# Patient Record
Sex: FEMALE | Race: WHITE | NOT HISPANIC OR LATINO | Employment: FULL TIME | URBAN - METROPOLITAN AREA
[De-identification: names, ages, dates, MRNs, and addresses within clinical notes are randomized per-mention and may not be internally consistent; named-entity substitution may affect disease eponyms.]

---

## 2024-05-22 ENCOUNTER — HOSPITAL ENCOUNTER (EMERGENCY)
Facility: HOSPITAL | Age: 29
Discharge: HOME | End: 2024-05-22
Attending: EMERGENCY MEDICINE
Payer: COMMERCIAL

## 2024-05-22 VITALS
HEART RATE: 87 BPM | DIASTOLIC BLOOD PRESSURE: 77 MMHG | OXYGEN SATURATION: 97 % | HEIGHT: 63 IN | TEMPERATURE: 98.1 F | SYSTOLIC BLOOD PRESSURE: 117 MMHG | RESPIRATION RATE: 16 BRPM | WEIGHT: 275 LBS | BODY MASS INDEX: 48.73 KG/M2

## 2024-05-22 DIAGNOSIS — O20.0 THREATENED MISCARRIAGE: Primary | ICD-10-CM

## 2024-05-22 LAB
ABO GROUP BLD: NORMAL
BACTERIA UR QL AUTO: ABNORMAL /HPF
BILIRUB UR QL STRIP: NEGATIVE
CLARITY UR: CLEAR
COLOR UR: YELLOW
GLUCOSE UR STRIP-MCNC: NEGATIVE MG/DL
HCG INTACT+B SERPL-ACNC: 15.9 MIU/ML
HGB UR QL STRIP.AUTO: ABNORMAL
HYALINE CASTS UR QL AUTO: ABNORMAL /LPF
KETONES UR QL STRIP: NEGATIVE
LEUKOCYTE ESTERASE UR QL STRIP.AUTO: NEGATIVE
NITRITE UR QL STRIP: NEGATIVE
PH UR STRIP.AUTO: 7 [PH] (ref 4.5–8)
PROT UR QL STRIP: NEGATIVE
RBC # UR STRIP: ABNORMAL /HPF
REF LAB TEST METHOD: ABNORMAL
RH BLD: POSITIVE
SP GR UR STRIP: 1.02 (ref 1–1.03)
SQUAMOUS #/AREA URNS HPF: ABNORMAL /HPF
UROBILINOGEN UR QL STRIP: ABNORMAL
WBC # UR STRIP: ABNORMAL /HPF

## 2024-05-22 PROCEDURE — 81001 URINALYSIS AUTO W/SCOPE: CPT | Performed by: EMERGENCY MEDICINE

## 2024-05-22 PROCEDURE — 86901 BLOOD TYPING SEROLOGIC RH(D): CPT | Performed by: EMERGENCY MEDICINE

## 2024-05-22 PROCEDURE — 84702 CHORIONIC GONADOTROPIN TEST: CPT | Performed by: EMERGENCY MEDICINE

## 2024-05-22 PROCEDURE — 99283 EMERGENCY DEPT VISIT LOW MDM: CPT

## 2024-05-22 PROCEDURE — 86900 BLOOD TYPING SEROLOGIC ABO: CPT | Performed by: EMERGENCY MEDICINE

## 2024-05-22 NOTE — ED PROVIDER NOTES
Subjective   History of Present Illness  Patient presents complaining of vaginal bleeding that started yesterday.  Patient said initially it was light and spotting today when she was going to work it increased. but then patient says she had her last menstrual cycle about 2024.  Patient took 2 home pregnancy test with lines that were inconclusive and then she did a digital one that was positive.  Patient has not seen OB/GYN yet.  Patient would be a  if she is pregnant today.  Patient denies any trauma or injury.  Patient says she is just having basic abdominal cramping that feels like a menstrual cycle.  No therapy taken prior to arrival.      Review of Systems   All other systems reviewed and are negative.      No past medical history on file.    No Known Allergies    No past surgical history on file.    No family history on file.    Social History     Socioeconomic History    Marital status: Single           Objective   Physical Exam  Vitals and nursing note reviewed.   Constitutional:       Comments: Patient sitting in bed comfortably, talkative, friendly, no signs of distress.  Cooperative with exam.   HENT:      Head: Normocephalic.      Right Ear: External ear normal.      Left Ear: External ear normal.      Nose: Nose normal.      Mouth/Throat:      Mouth: Mucous membranes are moist.      Pharynx: Oropharynx is clear.   Eyes:      Conjunctiva/sclera: Conjunctivae normal.   Cardiovascular:      Rate and Rhythm: Normal rate and regular rhythm.      Heart sounds: Normal heart sounds.   Pulmonary:      Effort: Pulmonary effort is normal.      Breath sounds: Normal breath sounds.   Abdominal:      General: Abdomen is protuberant. There is no distension.      Palpations: Abdomen is soft.      Tenderness: There is no right CVA tenderness, left CVA tenderness or guarding.      Hernia: No hernia is present.      Comments: Active bowel sounds in all 4 quadrants.   Musculoskeletal:         General: No swelling.       Cervical back: Neck supple.   Skin:     General: Skin is warm and dry.      Capillary Refill: Capillary refill takes 2 to 3 seconds.   Neurological:      Mental Status: She is alert and oriented to person, place, and time.   Psychiatric:         Mood and Affect: Mood normal.         Behavior: Behavior normal.         Procedures           ED Course  ED Course as of 05/22/24 1355   Wed May 22, 2024   1346 Discussed patient with Dr. Gonzales of OB/GYN and he wanted to have the patient follow-up on Tuesday next week for recheck of her quant. [AW]      ED Course User Index  [AW] Antoine Alexander MD                                             Medical Decision Making  Ddx menstrual cycle, miscarriage, implantation bleeding, ruptured ovarian cyst    Labs Reviewed  URINALYSIS W/ MICROSCOPIC IF INDICATED (NO CULTURE) - Abnormal; Notable for the following components:     Blood, UA                     Large (3+) (*)            All other components within normal limits  URINALYSIS, MICROSCOPIC ONLY - Abnormal; Notable for the following components:     RBC, UA                       11-20 (*)            All other components within normal limits  HCG, QUANTITATIVE, PREGNANCY         Narrative: HCG Ranges by Gestational Age                                    Females - non-pregnant premenopausal   </= 1mIU/mL HCG                  Females - postmenopausal               </= 7mIU/mL HCG                                    3 Weeks       5.4   -      72 mIU/mL                  4 Weeks      10.2   -     708 mIU/mL                  5 Weeks       217   -   8,245 mIU/mL                  6 Weeks       152   -  32,177 mIU/mL                  7 Weeks     4,059   - 153,767 mIU/mL                  8 Weeks    31,366   - 149,094 mIU/mL                  9 Weeks    59,109   - 135,901 mIU/mL                  10 Weeks   44,186   - 170,409 mIU/mL                  12 Weeks   27,107   - 201,615 mIU/mL                  14 Weeks   24,302   -  93,646  mIU/mL                  15 Weeks   12,540   -  69,747 mIU/mL                  16 Weeks    8,904   -  55,332 mIU/mL                  17 Weeks    8,240   -  51,793 mIU/mL                  18 Weeks    9,649   -  55,271 mIU/mL                    ABO/RH    1345 Pt seen again prior to d/c.  Labs reviewed and are remarkable for a low quant of 15.  Discussed with patient that this could be due to a very early pregnancy or it could be very low because of a miscarriage.  Discussed with patient that she needs to follow-up in a few days to have her quant rechecked and she was agreeable with this.  I did speak with OB and they are agreeable to see her and have an appointment scheduled.  Symptoms improved and pt feels better, vitals stable and pt. in NAD. Non-toxic. Comfortable. Ambulating without difficulty.  Tolerating po.  Relaxed breathing.  All questions personally answered at the bedside and all d/c instructions personally reviewed with pt.  Discussed the importance of close outpt. f/u and pt. understands this and agrees to do so.  Pt agrees to return to ED immediately for any new, persistent, or worsening symptoms.    EMR Dragon/Transcription disclaimer:  Much of this encounter note is an electronic transcription/translation of spoken language to printed text, aka voice recognition.  The electronic translation of spoken language may permit erroneous or at times nonsensical words or phrases to be inadvertently transcribed; although I have reviewed the note for such errors, some may still exist so please interpret based on surrounding text content.      Problems Addressed:  Threatened miscarriage: complicated acute illness or injury    Amount and/or Complexity of Data Reviewed  Labs: ordered.        Final diagnoses:   Threatened miscarriage       ED Disposition  ED Disposition       ED Disposition   Discharge    Condition   Stable    Comment   --               Faustina Richey, APRN  [address]  [address]  [place]  [id number]  [phone]    Schedule an appointment as soon as possible for a visit on 5/28/2024  1:15pm.  You can come about 30min before your appointment and check in the outpatient lab for your blood drawn.         Medication List      No changes were made to your prescriptions during this visit.            Antoine Alexander MD  05/22/24 [id number]

## 2024-05-23 ENCOUNTER — OFFICE VISIT (OUTPATIENT)
Dept: OBSTETRICS AND GYNECOLOGY | Facility: CLINIC | Age: 29
End: 2024-05-23
Payer: COMMERCIAL

## 2024-05-23 VITALS
SYSTOLIC BLOOD PRESSURE: 132 MMHG | DIASTOLIC BLOOD PRESSURE: 74 MMHG | WEIGHT: 270 LBS | HEIGHT: 63 IN | BODY MASS INDEX: 47.84 KG/M2

## 2024-05-23 DIAGNOSIS — O02.1 MISSED ABORTION: Primary | ICD-10-CM

## 2024-05-23 NOTE — PROGRESS NOTES
27 yo  here today for concern for MAB or threatened AB. Was seen in Ed yesterday, HcG  15.9, A+. LMP [id number]. Has appt with us next week.     This am multiple clots on pad and toilet. Denies SOB, syncope, dizziness.      PMH: No past medical history on file.             PSH:   No past surgical history on file.             Social hx:   Social History     Socioeconomic History    Marital status: Single        [  X ] no h/o abuse/DV:               Allergies:     No Known Allergies               Meds: No current outpatient medications on file.     Review of Systems   Bleeding in pregnancy     Vitals:    24 1357   BP: 132/74        Physical Exam  Constitutional:       Appearance: Normal appearance. She is obese.   Genitourinary:      Genitourinary Comments: Bleeding in vagina; small clots  Os closed   No POC seen       Right Labia: No tenderness, lesions or skin changes.     Left Labia: No tenderness, lesions or skin changes.     No cervical motion tenderness, friability or lesion.      Uterus is not enlarged or tender.   Cardiovascular:      Rate and Rhythm: Normal rate and regular rhythm.   Pulmonary:      Effort: Pulmonary effort is normal.      Breath sounds: Normal breath sounds.   Abdominal:      General: Abdomen is flat.      Palpations: Abdomen is soft.   Neurological:      General: No focal deficit present.      Mental Status: She is alert and oriented to person, place, and time.   Skin:     General: Skin is warm and dry.   Psychiatric:         Mood and Affect: Mood normal.         Behavior: Behavior normal.          Diagnoses and all orders for this visit:    1. Missed  (Primary)  -     HCG, B-subunit, Quantitative     Concerned for MAB or completed AB  Abdomen soft; no s/s of acute abdomen   HcG today. If down trending; consider Cytotec; DID not give today as may have viable pregnancy ( I do not think so but want another data point; pt agrees)   Work note 1 week     Graham Gonzales,  DO  05/23/2024    15:13 EDT

## 2024-05-24 ENCOUNTER — TELEPHONE (OUTPATIENT)
Dept: OBSTETRICS AND GYNECOLOGY | Facility: CLINIC | Age: 29
End: 2024-05-24
Payer: COMMERCIAL

## 2024-05-24 LAB — HCG INTACT+B SERPL-ACNC: 4.41 MIU/ML

## 2024-05-24 NOTE — TELEPHONE ENCOUNTER
I called and s/w pt and informed her that her quant was now 4, indicated a likely completed SAB.     Jordyn Banegas MD

## 2024-05-28 ENCOUNTER — OFFICE VISIT (OUTPATIENT)
Dept: OBSTETRICS AND GYNECOLOGY | Facility: CLINIC | Age: 29
End: 2024-05-28
Payer: COMMERCIAL

## 2024-05-28 VITALS
SYSTOLIC BLOOD PRESSURE: 146 MMHG | WEIGHT: 272.2 LBS | BODY MASS INDEX: 48.23 KG/M2 | DIASTOLIC BLOOD PRESSURE: 88 MMHG | HEIGHT: 63 IN

## 2024-05-28 DIAGNOSIS — O02.1 MISSED ABORTION: Primary | ICD-10-CM

## 2024-05-28 PROCEDURE — 99212 OFFICE O/P EST SF 10 MIN: CPT | Performed by: STUDENT IN AN ORGANIZED HEALTH CARE EDUCATION/TRAINING PROGRAM

## 2024-05-28 NOTE — PROGRESS NOTES
27 yo  here today with completed AB. Cramps stopped Friday afternoon. Dr Banegas talked to her on Friday.     This am multiple clots on pad and toilet. Denies SOB, syncope, dizziness.      PMH: No past medical history on file.             PSH:   No past surgical history on file.             Social hx:   Social History     Socioeconomic History    Marital status: Single        [  X ] no h/o abuse/DV:               Allergies:     No Known Allergies               Meds: No current outpatient medications on file.     Review of Systems   Bleeding in pregnancy     Vitals:    24 1311   BP: 146/88        Physical Exam  Constitutional:       Appearance: Normal appearance. She is obese.   Genitourinary:      No cervical motion tenderness, friability or lesion.      Uterus is not enlarged or tender.   Cardiovascular:      Rate and Rhythm: Normal rate and regular rhythm.   Pulmonary:      Effort: Pulmonary effort is normal.      Breath sounds: Normal breath sounds.   Abdominal:      General: Abdomen is flat.      Palpations: Abdomen is soft.   Neurological:      General: No focal deficit present.      Mental Status: She is alert and oriented to person, place, and time.   Skin:     General: Skin is warm and dry.   Psychiatric:         Mood and Affect: Mood normal.         Behavior: Behavior normal.          Diagnoses and all orders for this visit:    1. Missed  (Primary)       completed AB  Abdomen soft; no s/s of acute abdomen   HcG 4 on Friday   F/u prn   Will try again in 1-3 months     Graham Gonzales DO  2024    13:30 EDT

## 2024-08-21 ENCOUNTER — OFFICE VISIT (OUTPATIENT)
Dept: OBSTETRICS AND GYNECOLOGY | Facility: CLINIC | Age: 29
End: 2024-08-21
Payer: COMMERCIAL

## 2024-08-21 VITALS
WEIGHT: 272 LBS | SYSTOLIC BLOOD PRESSURE: 130 MMHG | HEIGHT: 63 IN | BODY MASS INDEX: 48.2 KG/M2 | DIASTOLIC BLOOD PRESSURE: 84 MMHG

## 2024-08-21 DIAGNOSIS — N93.9 ABNORMAL UTERINE BLEEDING (AUB): ICD-10-CM

## 2024-08-21 DIAGNOSIS — E88.810 METABOLIC SYNDROME X: ICD-10-CM

## 2024-08-21 DIAGNOSIS — Z13.89 SCREENING FOR GENITOURINARY CONDITION: Primary | ICD-10-CM

## 2024-08-21 LAB
B-HCG UR QL: NEGATIVE
BILIRUB BLD-MCNC: NEGATIVE MG/DL
CLARITY, POC: CLEAR
COLOR UR: YELLOW
EXPIRATION DATE: NORMAL
GLUCOSE UR STRIP-MCNC: NEGATIVE MG/DL
INTERNAL NEGATIVE CONTROL: NORMAL
INTERNAL POSITIVE CONTROL: NORMAL
KETONES UR QL: NEGATIVE
LEUKOCYTE EST, POC: NEGATIVE
Lab: NORMAL
NITRITE UR-MCNC: NEGATIVE MG/ML
PH UR: 5 [PH] (ref 5–8)
PROT UR STRIP-MCNC: ABNORMAL MG/DL
RBC # UR STRIP: ABNORMAL /UL
SP GR UR: 1 (ref 1–1.03)
UROBILINOGEN UR QL: NORMAL

## 2024-08-21 NOTE — PROGRESS NOTES
Here today for cramping; LMP [id number]. Did have MAB May 2024. Having bleeding and cramping. Unsure if she had + HcG at home. Denies F/C or N&V.     Menarche 9yo      LMP [id number]   No abnormal pap hx ( Last pap Aug 2023 NILM )     Last Appt: 27 yo  here today with completed AB. Cramps stopped Friday afternoon. Dr Banegas talked to her on Friday.     This am multiple clots on pad and toilet. Denies SOB, syncope, dizziness.      PMH: No past medical history on file.             PSH:   No past surgical history on file.             Social hx:   Social History     Socioeconomic History    Marital status: Single        [  X ] no h/o abuse/DV:               Allergies:     No Known Allergies               Meds: No current outpatient medications on file.     Review of Systems   Bleeding in pregnancy     Vitals:    24 1421   BP: 130/84        Physical Exam  Constitutional:       Appearance: Normal appearance. She is obese.   Genitourinary:      No cervical motion tenderness, friability or lesion.      Uterus is not enlarged or tender.   Cardiovascular:      Rate and Rhythm: Normal rate and regular rhythm.   Pulmonary:      Effort: Pulmonary effort is normal.      Breath sounds: Normal breath sounds.   Abdominal:      General: Abdomen is flat.      Palpations: Abdomen is soft.   Neurological:      General: No focal deficit present.      Mental Status: She is alert and oriented to person, place, and time.   Skin:     General: Skin is warm and dry.   Psychiatric:         Mood and Affect: Mood normal.         Behavior: Behavior normal.          Diagnoses and all orders for this visit:    1. Screening for genitourinary condition (Primary)  -     POC Urinalysis Dipstick  -     POC Pregnancy, Urine    2. Metabolic syndrome X  -     Ambulatory Referral to Internal Medicine    3. Abnormal uterine bleeding (AUB)         US reassuring  IM consult to assist with Met X, Weight loss  Continue timed intercourse and PNV    F/u as scheduled     Graham Gonzales DO  05/28/2024    14:48 EDT

## 2024-09-19 ENCOUNTER — OFFICE VISIT (OUTPATIENT)
Dept: OBSTETRICS AND GYNECOLOGY | Facility: CLINIC | Age: 29
End: 2024-09-19
Payer: COMMERCIAL

## 2024-09-19 VITALS
DIASTOLIC BLOOD PRESSURE: 78 MMHG | SYSTOLIC BLOOD PRESSURE: 124 MMHG | WEIGHT: 269 LBS | HEIGHT: 63 IN | BODY MASS INDEX: 47.66 KG/M2

## 2024-09-19 DIAGNOSIS — E28.2 PCOS (POLYCYSTIC OVARIAN SYNDROME): ICD-10-CM

## 2024-09-19 DIAGNOSIS — E88.810 METABOLIC SYNDROME X: Primary | ICD-10-CM

## 2024-09-26 ENCOUNTER — OFFICE VISIT (OUTPATIENT)
Dept: OBSTETRICS AND GYNECOLOGY | Facility: CLINIC | Age: 29
End: 2024-09-26
Payer: COMMERCIAL

## 2024-09-26 VITALS
SYSTOLIC BLOOD PRESSURE: 140 MMHG | HEIGHT: 63 IN | DIASTOLIC BLOOD PRESSURE: 90 MMHG | BODY MASS INDEX: 47.66 KG/M2 | WEIGHT: 269 LBS

## 2024-09-26 DIAGNOSIS — Z13.89 SCREENING FOR GENITOURINARY CONDITION: Primary | ICD-10-CM

## 2024-09-26 DIAGNOSIS — E88.810 METABOLIC SYNDROME X: ICD-10-CM

## 2024-09-26 DIAGNOSIS — E28.2 PCOS (POLYCYSTIC OVARIAN SYNDROME): ICD-10-CM

## 2024-09-26 LAB
B-HCG UR QL: NEGATIVE
BILIRUB BLD-MCNC: NEGATIVE MG/DL
CLARITY, POC: CLEAR
COLOR UR: YELLOW
EXPIRATION DATE: NORMAL
GLUCOSE UR STRIP-MCNC: NEGATIVE MG/DL
INTERNAL NEGATIVE CONTROL: NORMAL
INTERNAL POSITIVE CONTROL: NORMAL
KETONES UR QL: NEGATIVE
LEUKOCYTE EST, POC: NEGATIVE
Lab: NORMAL
NITRITE UR-MCNC: NEGATIVE MG/ML
PH UR: 5 [PH] (ref 5–8)
PROT UR STRIP-MCNC: NEGATIVE MG/DL
RBC # UR STRIP: NEGATIVE /UL
SP GR UR: 1 (ref 1–1.03)
UROBILINOGEN UR QL: NORMAL

## 2024-09-26 RX ORDER — MEDROXYPROGESTERONE ACETATE 10 MG
10 TABLET ORAL DAILY
Qty: 10 TABLET | Refills: 2 | Status: SHIPPED | OUTPATIENT
Start: 2024-09-26

## 2024-09-26 RX ORDER — LETROZOLE 2.5 MG/1
2.5 TABLET, FILM COATED ORAL DAILY
Qty: 5 TABLET | Refills: 2 | Status: SHIPPED | OUTPATIENT
Start: 2024-09-26

## 2024-09-26 RX ORDER — PRENATAL VIT NO.126/IRON/FOLIC 28MG-0.8MG
TABLET ORAL DAILY
COMMUNITY

## 2024-10-07 ENCOUNTER — OFFICE VISIT (OUTPATIENT)
Dept: INTERNAL MEDICINE | Facility: CLINIC | Age: 29
End: 2024-10-07
Payer: COMMERCIAL

## 2024-10-07 VITALS
BODY MASS INDEX: 48.37 KG/M2 | DIASTOLIC BLOOD PRESSURE: 90 MMHG | HEART RATE: 75 BPM | WEIGHT: 273 LBS | OXYGEN SATURATION: 98 % | SYSTOLIC BLOOD PRESSURE: 120 MMHG | TEMPERATURE: 98.6 F

## 2024-10-07 DIAGNOSIS — E66.01 CLASS 3 SEVERE OBESITY WITH SERIOUS COMORBIDITY AND BODY MASS INDEX (BMI) OF 45.0 TO 49.9 IN ADULT, UNSPECIFIED OBESITY TYPE: ICD-10-CM

## 2024-10-07 DIAGNOSIS — E78.5 DYSLIPIDEMIA: Primary | ICD-10-CM

## 2024-10-07 DIAGNOSIS — E66.813 CLASS 3 SEVERE OBESITY WITH SERIOUS COMORBIDITY AND BODY MASS INDEX (BMI) OF 45.0 TO 49.9 IN ADULT, UNSPECIFIED OBESITY TYPE: ICD-10-CM

## 2024-10-07 DIAGNOSIS — E28.2 PCOS (POLYCYSTIC OVARIAN SYNDROME): ICD-10-CM

## 2024-10-07 DIAGNOSIS — N93.9 ABNORMAL UTERINE BLEEDING (AUB): ICD-10-CM

## 2024-10-07 PROCEDURE — 99204 OFFICE O/P NEW MOD 45 MIN: CPT | Performed by: INTERNAL MEDICINE

## 2024-10-07 NOTE — PROGRESS NOTES
Marina Mitchell is a 29 y.o. female, who presents with a chief complaint of   Chief Complaint   Patient presents with    [facility]     Has some weight concerns and pregnancy issues and was referred from OBGYN           HPI   Pt here to [facility].  She was refereed here by her ob/gyn Dr. Gonzales.     She got pregnant earlier this year and had a miscarriage.  Prior to her pregnancy she had been on depo-provera for about 14-15 years.  It took a while but her cycles became regular.  Since the miscarriage she has had irregular cycles - usually a week late.  She has had lots of migraines and cramping prior to her cycles.  She has about 3-4 days of bleeding.  2 days are spotting and 2 days are very heavy bleeding.       Pt has always been active but has struggled to lose weight.        The following portions of the patient's history were reviewed and updated as appropriate: allergies, current medications, past family history, past medical history, past social history, past surgical history and problem list.    Allergies: Patient has no known allergies.    Review of Systems   Constitutional: Negative.    HENT: Negative.     Eyes: Negative.    Respiratory: Negative.     Cardiovascular: Negative.    Gastrointestinal: Negative.    Endocrine: Negative.    Genitourinary: Negative.    Musculoskeletal: Negative.    Skin: Negative.    Allergic/Immunologic: Negative.    Neurological: Negative.    Hematological: Negative.    Psychiatric/Behavioral: Negative.     All other systems reviewed and are negative.            Wt Readings from Last 3 Encounters:   10/07/24 124 kg (273 lb)   09/26/24 122 kg (269 lb)   09/19/24 122 kg (269 lb)     Temp Readings from Last 3 Encounters:   10/07/24 98.6 °F (37 °C) (Infrared)   05/22/24 98.1 °F (36.7 °C)     BP Readings from Last 3 Encounters:   10/07/24 120/90   09/26/24 140/90   09/19/24 124/78     Pulse Readings from Last 3 Encounters:   10/07/24 75   05/22/24 87     Body mass index is  48.37 kg/m².  SpO2 Readings from Last 3 Encounters:   10/07/24 98%   05/22/24 97%          Physical Exam  Vitals and nursing note reviewed.   Constitutional:       General: She is not in acute distress.     Appearance: She is well-developed.   HENT:      Head: Normocephalic and atraumatic.      Right Ear: External ear normal.      Left Ear: External ear normal.      Nose: Nose normal.   Eyes:      Conjunctiva/sclera: Conjunctivae normal.      Pupils: Pupils are equal, round, and reactive to light.   Cardiovascular:      Rate and Rhythm: Normal rate and regular rhythm.      Heart sounds: Normal heart sounds.   Pulmonary:      Effort: Pulmonary effort is normal. No respiratory distress.      Breath sounds: Normal breath sounds. No wheezing.   Musculoskeletal:         General: Normal range of motion.      Cervical back: Normal range of motion and neck supple.      Comments: Normal gait   Skin:     General: Skin is warm and dry.   Neurological:      Mental Status: She is alert and oriented to person, place, and time.   Psychiatric:         Behavior: Behavior normal.         Thought Content: Thought content normal.         Judgment: Judgment normal.         Results for orders placed or performed in visit on 09/26/24   POC Urinalysis Dipstick    Specimen: Urine   Result Value Ref Range    Color Yellow Yellow, Straw, Dark Yellow, Maryellen    Clarity, UA Clear Clear    Glucose, UA Negative Negative mg/dL    Bilirubin Negative Negative    Ketones, UA Negative Negative    Specific Gravity  1.005 1.005 - 1.030    Blood, UA Negative Negative    pH, Urine 5.0 5.0 - 8.0    Protein, POC Negative Negative mg/dL    Urobilinogen, UA Normal Normal, 0.2 E.U./dL    Leukocytes Negative Negative    Nitrite, UA Negative Negative   POC Pregnancy, Urine    Specimen: Urine   Result Value Ref Range    HCG, Urine, QL Negative Negative    Lot Number [id number]     Internal Positive Control Passed Positive, Passed    Internal Negative Control Passed  Negative, Passed    Expiration Date 4/5/25      Result Review :   The following data was reviewed by: Precious Suárez MD on 10/07/2024:  Common labs          10/7/2024    14:33   Common Labs   Glucose 89    BUN 7    Creatinine 0.66    Sodium 138    Potassium 4.1    Chloride 101    Calcium 9.4    Total Protein 7.2    Albumin 4.6    Total Bilirubin 0.5    Alkaline Phosphatase 65    AST (SGOT) 49    ALT (SGPT) 83    WBC 11.44    Hemoglobin 14.1    Hematocrit 44.1    Platelets 385    Total Cholesterol 169    Triglycerides 178    HDL Cholesterol 39    LDL Cholesterol  99    Hemoglobin A1C 5.60      Data reviewed : Consultant notes gynecology notes            Assessment and Plan    Diagnoses and all orders for this visit:    1. Dyslipidemia (Primary)  -     Tirzepatide-Weight Management (ZEPBOUND) 2.5 MG/0.5ML solution auto-injector; Inject 0.5 mL under the skin into the appropriate area as directed 1 (One) Time Per Week.  Dispense: 2 mL; Refill: 1  -     Comprehensive Metabolic Panel  -     Lipid Panel With LDL / HDL Ratio    2. PCOS (polycystic ovarian syndrome)  -     Tirzepatide-Weight Management (ZEPBOUND) 2.5 MG/0.5ML solution auto-injector; Inject 0.5 mL under the skin into the appropriate area as directed 1 (One) Time Per Week.  Dispense: 2 mL; Refill: 1  -     CBC & Differential  -     Comprehensive Metabolic Panel  -     Hemoglobin A1c  -     Lipid Panel With LDL / HDL Ratio  -     T4, Free  -     TSH  -     Ferritin  -     Iron Profile    3. Class 3 severe obesity with serious comorbidity and body mass index (BMI) of 45.0 to 49.9 in adult, unspecified obesity type  -     Tirzepatide-Weight Management (ZEPBOUND) 2.5 MG/0.5ML solution auto-injector; Inject 0.5 mL under the skin into the appropriate area as directed 1 (One) Time Per Week.  Dispense: 2 mL; Refill: 1  -     CBC & Differential  -     Comprehensive Metabolic Panel  -     Hemoglobin A1c  -     Lipid Panel With LDL / HDL Ratio  -     T4, Free  -      TSH  -     Ferritin  -     Iron Profile    4. Abnormal uterine bleeding (AUB)  -     CBC & Differential  -     Comprehensive Metabolic Panel  -     Hemoglobin A1c  -     T4, Free  -     TSH  -     Ferritin  -     Iron Profile         Class 3 Severe Obesity (BMI >=40). Obesity-related health conditions include the following: dyslipidemias. Obesity is unchanged. BMI is is above average; no BMI management plan is appropriate. We discussed portion control, increasing exercise, and pharmacologic options including zepbound. .             Outpatient Medications Prior to Visit   Medication Sig Dispense Refill    letrozole (FEMARA) 2.5 MG tablet Take 1 tablet by mouth Daily. Pt will take on day 3,4, or 5 following menses for 5 days 5 tablet 2    medroxyPROGESTERone (Provera) 10 MG tablet Take 1 tablet by mouth Daily. 10 tablet 2    prenatal vitamin (prenatal, CLASSIC, vitamin) tablet Take  by mouth Daily.       No facility-administered medications prior to visit.     New Medications Ordered This Visit   Medications    Tirzepatide-Weight Management (ZEPBOUND) 2.5 MG/0.5ML solution auto-injector     Sig: Inject 0.5 mL under the skin into the appropriate area as directed 1 (One) Time Per Week.     Dispense:  2 mL     Refill:  1     [unfilled]  There are no discontinued medications.      Return in about 3 months (around 1/7/2025) for Recheck, labs.    Patient was given instructions and counseling regarding her condition or for health maintenance advice. Please see specific information pulled into the AVS if appropriate.

## 2024-10-08 LAB
ALBUMIN SERPL-MCNC: 4.6 G/DL (ref 3.5–5.2)
ALBUMIN/GLOB SERPL: 1.8 G/DL
ALP SERPL-CCNC: 65 U/L (ref 39–117)
ALT SERPL-CCNC: 83 U/L (ref 1–33)
AST SERPL-CCNC: 49 U/L (ref 1–32)
BASOPHILS # BLD AUTO: 0.06 10*3/MM3 (ref 0–0.2)
BASOPHILS NFR BLD AUTO: 0.5 % (ref 0–1.5)
BILIRUB SERPL-MCNC: 0.5 MG/DL (ref 0–1.2)
BUN SERPL-MCNC: 7 MG/DL (ref 6–20)
BUN/CREAT SERPL: 10.6 (ref 7–25)
CALCIUM SERPL-MCNC: 9.4 MG/DL (ref 8.6–10.5)
CHLORIDE SERPL-SCNC: 101 MMOL/L (ref 98–107)
CHOLEST SERPL-MCNC: 169 MG/DL (ref 0–200)
CO2 SERPL-SCNC: 25.8 MMOL/L (ref 22–29)
CREAT SERPL-MCNC: 0.66 MG/DL (ref 0.57–1)
EGFRCR SERPLBLD CKD-EPI 2021: 122.7 ML/MIN/1.73
EOSINOPHIL # BLD AUTO: 0.19 10*3/MM3 (ref 0–0.4)
EOSINOPHIL NFR BLD AUTO: 1.7 % (ref 0.3–6.2)
ERYTHROCYTE [DISTWIDTH] IN BLOOD BY AUTOMATED COUNT: 12.3 % (ref 12.3–15.4)
FERRITIN SERPL-MCNC: 244 NG/ML (ref 13–150)
GLOBULIN SER CALC-MCNC: 2.6 GM/DL
GLUCOSE SERPL-MCNC: 89 MG/DL (ref 65–99)
HBA1C MFR BLD: 5.6 % (ref 4.8–5.6)
HCT VFR BLD AUTO: 44.1 % (ref 34–46.6)
HDLC SERPL-MCNC: 39 MG/DL (ref 40–60)
HGB BLD-MCNC: 14.1 G/DL (ref 12–15.9)
IMM GRANULOCYTES # BLD AUTO: 0.03 10*3/MM3 (ref 0–0.05)
IMM GRANULOCYTES NFR BLD AUTO: 0.3 % (ref 0–0.5)
IRON SATN MFR SERPL: 24 % (ref 20–50)
IRON SERPL-MCNC: 92 MCG/DL (ref 37–145)
LDLC SERPL CALC-MCNC: 99 MG/DL (ref 0–100)
LDLC/HDLC SERPL: 2.42 {RATIO}
LYMPHOCYTES # BLD AUTO: 4.3 10*3/MM3 (ref 0.7–3.1)
LYMPHOCYTES NFR BLD AUTO: 37.6 % (ref 19.6–45.3)
MCH RBC QN AUTO: 29.3 PG (ref 26.6–33)
MCHC RBC AUTO-ENTMCNC: 32 G/DL (ref 31.5–35.7)
MCV RBC AUTO: 91.5 FL (ref 79–97)
MONOCYTES # BLD AUTO: 0.49 10*3/MM3 (ref 0.1–0.9)
MONOCYTES NFR BLD AUTO: 4.3 % (ref 5–12)
NEUTROPHILS # BLD AUTO: 6.37 10*3/MM3 (ref 1.7–7)
NEUTROPHILS NFR BLD AUTO: 55.6 % (ref 42.7–76)
NRBC BLD AUTO-RTO: 0 /100 WBC (ref 0–0.2)
PLATELET # BLD AUTO: 385 10*3/MM3 (ref 140–450)
POTASSIUM SERPL-SCNC: 4.1 MMOL/L (ref 3.5–5.2)
PROT SERPL-MCNC: 7.2 G/DL (ref 6–8.5)
RBC # BLD AUTO: 4.82 10*6/MM3 (ref 3.77–5.28)
SODIUM SERPL-SCNC: 138 MMOL/L (ref 136–145)
T4 FREE SERPL-MCNC: 1.09 NG/DL (ref 0.92–1.68)
TIBC SERPL-MCNC: 383 MCG/DL
TRIGL SERPL-MCNC: 178 MG/DL (ref 0–150)
TSH SERPL DL<=0.005 MIU/L-ACNC: 1.49 UIU/ML (ref 0.27–4.2)
UIBC SERPL-MCNC: 291 MCG/DL (ref 112–346)
VLDLC SERPL CALC-MCNC: 31 MG/DL (ref 5–40)
WBC # BLD AUTO: 11.44 10*3/MM3 (ref 3.4–10.8)

## 2024-10-09 ENCOUNTER — PATIENT ROUNDING (BHMG ONLY) (OUTPATIENT)
Dept: INTERNAL MEDICINE | Facility: CLINIC | Age: 29
End: 2024-10-09
Payer: COMMERCIAL

## 2024-10-09 NOTE — PROGRESS NOTES
My name is Serena Hernandez and I am the Referral clerk at [facility].     I would like  to officially welcome you to our practice and ask about your recent visit.     Tell me about your visit with us. What things went well?        We're always looking for ways to make our patients' experiences even better. Do you have recommendations on ways we may improve?      Overall were you satisfied with your first visit to our practice?        I appreciate you taking the time to answer these questions. Is there anything else I can do for you?       Thank you, and have a great day.     Serena

## 2024-10-10 ENCOUNTER — PATIENT ROUNDING (BHMG ONLY) (OUTPATIENT)
Dept: INTERNAL MEDICINE | Facility: CLINIC | Age: 29
End: 2024-10-10
Payer: COMMERCIAL

## 2024-12-30 ENCOUNTER — TELEPHONE (OUTPATIENT)
Dept: INTERNAL MEDICINE | Facility: CLINIC | Age: 29
End: 2024-12-30
Payer: COMMERCIAL

## 2025-01-10 DIAGNOSIS — E66.01 CLASS 3 SEVERE OBESITY WITH SERIOUS COMORBIDITY AND BODY MASS INDEX (BMI) OF 45.0 TO 49.9 IN ADULT, UNSPECIFIED OBESITY TYPE: Primary | ICD-10-CM

## 2025-01-10 DIAGNOSIS — E78.5 DYSLIPIDEMIA: ICD-10-CM

## 2025-01-10 DIAGNOSIS — E66.813 CLASS 3 SEVERE OBESITY WITH SERIOUS COMORBIDITY AND BODY MASS INDEX (BMI) OF 45.0 TO 49.9 IN ADULT, UNSPECIFIED OBESITY TYPE: Primary | ICD-10-CM

## 2025-01-10 DIAGNOSIS — R79.89 ELEVATED LIVER FUNCTION TESTS: ICD-10-CM

## 2025-01-10 LAB
ALBUMIN SERPL-MCNC: 4.4 G/DL (ref 3.5–5.2)
ALBUMIN/GLOB SERPL: 1.6 G/DL
ALP SERPL-CCNC: 59 U/L (ref 39–117)
ALT SERPL-CCNC: 48 U/L (ref 1–33)
AST SERPL-CCNC: 32 U/L (ref 1–32)
BASOPHILS # BLD AUTO: 0.04 10*3/MM3 (ref 0–0.2)
BASOPHILS NFR BLD AUTO: 0.4 % (ref 0–1.5)
BILIRUB SERPL-MCNC: 0.5 MG/DL (ref 0–1.2)
BUN SERPL-MCNC: 8 MG/DL (ref 6–20)
BUN/CREAT SERPL: 9.4 (ref 7–25)
CALCIUM SERPL-MCNC: 9.6 MG/DL (ref 8.6–10.5)
CHLORIDE SERPL-SCNC: 103 MMOL/L (ref 98–107)
CHOLEST SERPL-MCNC: 173 MG/DL (ref 0–200)
CO2 SERPL-SCNC: 27 MMOL/L (ref 22–29)
CREAT SERPL-MCNC: 0.85 MG/DL (ref 0.57–1)
EGFRCR SERPLBLD CKD-EPI 2021: 95.8 ML/MIN/1.73
EOSINOPHIL # BLD AUTO: 1.03 10*3/MM3 (ref 0–0.4)
EOSINOPHIL NFR BLD AUTO: 10 % (ref 0.3–6.2)
ERYTHROCYTE [DISTWIDTH] IN BLOOD BY AUTOMATED COUNT: 11.7 % (ref 12.3–15.4)
GLOBULIN SER CALC-MCNC: 2.8 GM/DL
GLUCOSE SERPL-MCNC: 91 MG/DL (ref 65–99)
HBA1C MFR BLD: 5.4 % (ref 4.8–5.6)
HCT VFR BLD AUTO: 44.3 % (ref 34–46.6)
HDLC SERPL-MCNC: 37 MG/DL (ref 40–60)
HGB BLD-MCNC: 15.1 G/DL (ref 12–15.9)
IMM GRANULOCYTES # BLD AUTO: 0.03 10*3/MM3 (ref 0–0.05)
IMM GRANULOCYTES NFR BLD AUTO: 0.3 % (ref 0–0.5)
LDLC SERPL CALC-MCNC: 112 MG/DL (ref 0–100)
LDLC/HDLC SERPL: 2.95 {RATIO}
LYMPHOCYTES # BLD AUTO: 3.4 10*3/MM3 (ref 0.7–3.1)
LYMPHOCYTES NFR BLD AUTO: 33 % (ref 19.6–45.3)
MCH RBC QN AUTO: 30.3 PG (ref 26.6–33)
MCHC RBC AUTO-ENTMCNC: 34.1 G/DL (ref 31.5–35.7)
MCV RBC AUTO: 89 FL (ref 79–97)
MONOCYTES # BLD AUTO: 0.47 10*3/MM3 (ref 0.1–0.9)
MONOCYTES NFR BLD AUTO: 4.6 % (ref 5–12)
NEUTROPHILS # BLD AUTO: 5.34 10*3/MM3 (ref 1.7–7)
NEUTROPHILS NFR BLD AUTO: 51.7 % (ref 42.7–76)
NRBC BLD AUTO-RTO: 0 /100 WBC (ref 0–0.2)
PLATELET # BLD AUTO: 374 10*3/MM3 (ref 140–450)
POTASSIUM SERPL-SCNC: 4.4 MMOL/L (ref 3.5–5.2)
PROT SERPL-MCNC: 7.2 G/DL (ref 6–8.5)
RBC # BLD AUTO: 4.98 10*6/MM3 (ref 3.77–5.28)
SODIUM SERPL-SCNC: 139 MMOL/L (ref 136–145)
TRIGL SERPL-MCNC: 134 MG/DL (ref 0–150)
VLDLC SERPL CALC-MCNC: 24 MG/DL (ref 5–40)
WBC # BLD AUTO: 10.31 10*3/MM3 (ref 3.4–10.8)

## 2025-01-14 ENCOUNTER — TELEMEDICINE (OUTPATIENT)
Dept: INTERNAL MEDICINE | Facility: CLINIC | Age: 30
End: 2025-01-14
Payer: COMMERCIAL

## 2025-01-14 VITALS — HEIGHT: 63 IN | BODY MASS INDEX: 46.78 KG/M2 | WEIGHT: 264 LBS

## 2025-01-14 DIAGNOSIS — R79.89 ELEVATED LIVER FUNCTION TESTS: Primary | ICD-10-CM

## 2025-01-14 DIAGNOSIS — E28.2 PCOS (POLYCYSTIC OVARIAN SYNDROME): ICD-10-CM

## 2025-01-14 DIAGNOSIS — E78.5 DYSLIPIDEMIA: ICD-10-CM

## 2025-01-14 DIAGNOSIS — E66.01 CLASS 3 SEVERE OBESITY WITH SERIOUS COMORBIDITY AND BODY MASS INDEX (BMI) OF 45.0 TO 49.9 IN ADULT, UNSPECIFIED OBESITY TYPE: ICD-10-CM

## 2025-01-14 DIAGNOSIS — E66.813 CLASS 3 SEVERE OBESITY WITH SERIOUS COMORBIDITY AND BODY MASS INDEX (BMI) OF 45.0 TO 49.9 IN ADULT, UNSPECIFIED OBESITY TYPE: ICD-10-CM

## 2025-01-14 PROBLEM — N93.9 ABNORMAL UTERINE BLEEDING (AUB): Status: RESOLVED | Noted: 2024-08-21 | Resolved: 2025-01-14

## 2025-01-14 PROCEDURE — 99214 OFFICE O/P EST MOD 30 MIN: CPT | Performed by: INTERNAL MEDICINE

## 2025-01-14 NOTE — PROGRESS NOTES
Marina Mitchell is a 29 y.o. female, who presents with a chief complaint of   Chief Complaint   Patient presents with    Dyslipidemia     3 month f/u            HPI   This visit has been scheduled as a telehealth visit to comply with patient safety concerns in accordance with CDC recommendations. This was an audio and video enabled telemedicine encounter.    You have chosen to receive care through a televisit visit. Do you consent to use a televisit visit for your medical care today? Yes    Pt is at work and I am in the office.    Pt here for f/u.  She has made lots of changes to her diet.  She cut out red meat and lots of carbs.  She has been eating lots of fruits, veggies, and lean protein.  She started the zepbound in October.  She had she had lots of gas and diarrhea.  She had to make further diet adjustments and things are better.  She also has a weight  through [employer]/insurance.  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        The following portions of the patient's history were reviewed and updated as appropriate: allergies, current medications, past family history, past medical history, past social history, past surgical history and problem list.    Allergies: Patient has no known allergies.    Review of Systems   Constitutional: Negative.    HENT: Negative.     Eyes: Negative.    Respiratory: Negative.     Cardiovascular: Negative.    Gastrointestinal: Negative.    Endocrine: Negative.    Genitourinary: Negative.    Musculoskeletal: Negative.    Skin: Negative.    Allergic/Immunologic: Negative.    Neurological: Negative.    Hematological: Negative.    Psychiatric/Behavioral: Negative.     All other systems reviewed and are negative.            Wt Readings from Last 3 Encounters:   01/14/25 120 kg (264 lb)   10/07/24 124 kg (273 lb)   09/26/24 122 kg (269 lb)     Temp Readings from Last 3 Encounters:    10/07/24 98.6 °F (37 °C) (Infrared)   05/22/24 98.1 °F (36.7 °C)     BP Readings from Last 3 Encounters:   10/07/24 120/90   09/26/24 140/90   09/19/24 124/78     Pulse Readings from Last 3 Encounters:   10/07/24 75   05/22/24 87     Body mass index is 46.78 kg/m².  SpO2 Readings from Last 3 Encounters:   10/07/24 98%   05/22/24 97%          Physical Exam  Vitals and nursing note reviewed.   Constitutional:       Appearance: She is well-developed.   HENT:      Head: Normocephalic and atraumatic.      Nose: Nose normal.   Eyes:      General:         Right eye: No discharge.         Left eye: No discharge.      Conjunctiva/sclera: Conjunctivae normal.   Pulmonary:      Effort: Pulmonary effort is normal. No respiratory distress.   Musculoskeletal:      Cervical back: Normal range of motion and neck supple.   Skin:     Findings: No rash.   Neurological:      Mental Status: She is alert and oriented to person, place, and time.   Psychiatric:         Behavior: Behavior normal.         Thought Content: Thought content normal.         Judgment: Judgment normal.         Results for orders placed or performed in visit on 01/10/25   Comprehensive Metabolic Panel    Collection Time: 01/10/25 10:22 AM    Specimen: Blood   Result Value Ref Range    Glucose 91 65 - 99 mg/dL    BUN 8 6 - 20 mg/dL    Creatinine 0.85 0.57 - 1.00 mg/dL    EGFR Result 95.8 >60.0 mL/min/1.73    BUN/Creatinine Ratio 9.4 7.0 - 25.0    Sodium 139 136 - 145 mmol/L    Potassium 4.4 3.5 - 5.2 mmol/L    Chloride 103 98 - 107 mmol/L    Total CO2 27.0 22.0 - 29.0 mmol/L    Calcium 9.6 8.6 - 10.5 mg/dL    Total Protein 7.2 6.0 - 8.5 g/dL    Albumin 4.4 3.5 - 5.2 g/dL    Globulin 2.8 gm/dL    A/G Ratio 1.6 g/dL    Total Bilirubin 0.5 0.0 - 1.2 mg/dL    Alkaline Phosphatase 59 39 - 117 U/L    AST (SGOT) 32 1 - 32 U/L    ALT (SGPT) 48 (H) 1 - 33 U/L   Hemoglobin A1c    Collection Time: 01/10/25 10:22 AM    Specimen: Blood   Result Value Ref Range    Hemoglobin  A1C 5.40 4.80 - 5.60 %   Lipid Panel With LDL / HDL Ratio    Collection Time: 01/10/25 10:22 AM    Specimen: Blood   Result Value Ref Range    Total Cholesterol 173 0 - 200 mg/dL    Triglycerides 134 0 - 150 mg/dL    HDL Cholesterol 37 (L) 40 - 60 mg/dL    VLDL Cholesterol Gordon 24 5 - 40 mg/dL    LDL Chol Calc (NIH) 112 (H) 0 - 100 mg/dL    LDL/HDL RATIO 2.95    CBC & Differential    Collection Time: 01/10/25 10:22 AM    Specimen: Blood   Result Value Ref Range    WBC 10.31 3.40 - 10.80 10*3/mm3    RBC 4.98 3.77 - 5.28 10*6/mm3    Hemoglobin 15.1 12.0 - 15.9 g/dL    Hematocrit 44.3 34.0 - 46.6 %    MCV 89.0 79.0 - 97.0 fL    MCH 30.3 26.6 - 33.0 pg    MCHC 34.1 31.5 - 35.7 g/dL    RDW 11.7 (L) 12.3 - 15.4 %    Platelets 374 140 - 450 10*3/mm3    Neutrophil Rel % 51.7 42.7 - 76.0 %    Lymphocyte Rel % 33.0 19.6 - 45.3 %    Monocyte Rel % 4.6 (L) 5.0 - 12.0 %    Eosinophil Rel % 10.0 (H) 0.3 - 6.2 %    Basophil Rel % 0.4 0.0 - 1.5 %    Neutrophils Absolute 5.34 1.70 - 7.00 10*3/mm3    Lymphocytes Absolute 3.40 (H) 0.70 - 3.10 10*3/mm3    Monocytes Absolute 0.47 0.10 - 0.90 10*3/mm3    Eosinophils Absolute 1.03 (H) 0.00 - 0.40 10*3/mm3    Basophils Absolute 0.04 0.00 - 0.20 10*3/mm3    Immature Granulocyte Rel % 0.3 0.0 - 0.5 %    Immature Grans Absolute 0.03 0.00 - 0.05 10*3/mm3    nRBC 0.0 0.0 - 0.2 /100 WBC     Result Review :                  Assessment and Plan    Diagnoses and all orders for this visit:    1. Elevated liver function tests (Primary) - improved but not all the way back to normal.  Repeat labs in 6 mo  -     Comprehensive Metabolic Panel; Future    2. Dyslipidemia - triglycerides back to normal but hdl low and ldl high.  Cont healthy diet, weight loss, and exercise  -     Tirzepatide-Weight Management (ZEPBOUND) 2.5 MG/0.5ML solution auto-injector; Inject 0.5 mL under the skin into the appropriate area as directed 1 (One) Time Per Week.  Dispense: 6 mL; Refill: 1  -     Comprehensive Metabolic Panel;  Future  -     Lipid Panel With LDL / HDL Ratio; Future    3. PCOS (polycystic ovarian syndrome) - glucoses improved.  -     Tirzepatide-Weight Management (ZEPBOUND) 2.5 MG/0.5ML solution auto-injector; Inject 0.5 mL under the skin into the appropriate area as directed 1 (One) Time Per Week.  Dispense: 6 mL; Refill: 1  -     CBC & Differential; Future  -     Comprehensive Metabolic Panel; Future  -     Hemoglobin A1c; Future  -     Lipid Panel With LDL / HDL Ratio; Future  -     T4, Free; Future  -     TSH; Future    4. Class 3 severe obesity with serious comorbidity and body mass index (BMI) of 45.0 to 49.9 in adult, unspecified obesity type - weight down about 10 pounds.  Doing well on low dose zepbound.  Cont current dose.  -     Tirzepatide-Weight Management (ZEPBOUND) 2.5 MG/0.5ML solution auto-injector; Inject 0.5 mL under the skin into the appropriate area as directed 1 (One) Time Per Week.  Dispense: 6 mL; Refill: 1  -     CBC & Differential; Future  -     Comprehensive Metabolic Panel; Future  -     Hemoglobin A1c; Future  -     Lipid Panel With LDL / HDL Ratio; Future  -     T4, Free; Future  -     TSH; Future                       Outpatient Medications Prior to Visit   Medication Sig Dispense Refill    prenatal vitamin (prenatal, CLASSIC, vitamin) tablet Take  by mouth Daily.      Tirzepatide-Weight Management (ZEPBOUND) 2.5 MG/0.5ML solution auto-injector Inject 0.5 mL under the skin into the appropriate area as directed 1 (One) Time Per Week. 2 mL 1    letrozole (FEMARA) 2.5 MG tablet Take 1 tablet by mouth Daily. Pt will take on day 3,4, or 5 following menses for 5 days (Patient not taking: Reported on 1/14/2025) 5 tablet 2    medroxyPROGESTERone (Provera) 10 MG tablet Take 1 tablet by mouth Daily. (Patient not taking: Reported on 1/14/2025) 10 tablet 2     No facility-administered medications prior to visit.     New Medications Ordered This Visit   Medications    Tirzepatide-Weight Management  (ZEPBOUND) 2.5 MG/0.5ML solution auto-injector     Sig: Inject 0.5 mL under the skin into the appropriate area as directed 1 (One) Time Per Week.     Dispense:  6 mL     Refill:  1     [unfilled]  Medications Discontinued During This Encounter   Medication Reason    Tirzepatide-Weight Management (ZEPBOUND) 2.5 MG/0.5ML solution auto-injector Reorder         Return in about 6 months (around 7/14/2025) for Recheck, labs.    Patient was given instructions and counseling regarding her condition or for health maintenance advice. Please see specific information pulled into the AVS if appropriate.

## 2025-02-03 ENCOUNTER — TELEMEDICINE (OUTPATIENT)
Dept: INTERNAL MEDICINE | Facility: CLINIC | Age: 30
End: 2025-02-03
Payer: COMMERCIAL

## 2025-02-03 VITALS — WEIGHT: 258 LBS | HEIGHT: 63 IN | BODY MASS INDEX: 45.71 KG/M2

## 2025-02-03 DIAGNOSIS — E88.810 METABOLIC SYNDROME X: ICD-10-CM

## 2025-02-03 DIAGNOSIS — B34.9 VIRAL ILLNESS: Primary | ICD-10-CM

## 2025-02-03 DIAGNOSIS — E28.2 PCOS (POLYCYSTIC OVARIAN SYNDROME): ICD-10-CM

## 2025-02-03 PROCEDURE — 99214 OFFICE O/P EST MOD 30 MIN: CPT | Performed by: NURSE PRACTITIONER

## 2025-02-03 RX ORDER — ONDANSETRON 8 MG/1
8 TABLET, FILM COATED ORAL EVERY 8 HOURS PRN
Qty: 20 TABLET | Refills: 0 | Status: SHIPPED | OUTPATIENT
Start: 2025-02-03 | End: 2025-02-13

## 2025-02-03 NOTE — ASSESSMENT & PLAN NOTE
- discussed adjusting administration of Zepbound given her current illness. If she feels well on Thursday she can proceed w/ injection. If not, then I advised her it is ok to wait until Sunday to administer.

## 2025-02-03 NOTE — PROGRESS NOTES
Marina Mitchell is a 29 y.o. female presenting today for   Chief Complaint   Patient presents with    Fever     Symptoms all started last Friday, has not tested at home for anything    Generalized Body Aches    Chills    Diarrhea    Nausea     This visit was conducted via [facility].    At the time of this visit, I am located in my office and the patient in his/her home.    Pt gave consent for telehealth services.    Pt presents for an acute visit; her PCP is Dr. Suárez.        Subjective    Fever   This is a new problem. Episode onset: 4 days ago. The problem occurs constantly. The problem has been waxing and waning. The maximum temperature noted was 101 to 101.9 F. Associated symptoms include coughing, diarrhea and nausea. Pertinent negatives include no congestion or vomiting. Treatments tried: nyquil.   Risk factors: sick contacts           The following portions of the patient's history were reviewed and updated as appropriate: allergies, current medications, problem list, past medical history, past surgical history, family history, and social history.    Review of Systems   Constitutional:  Positive for chills and fever.   HENT:  Negative for congestion and rhinorrhea.    Respiratory:  Positive for cough.    Gastrointestinal:  Positive for diarrhea and nausea. Negative for vomiting.   Musculoskeletal:  Positive for myalgias.   Neurological:  Positive for dizziness.         Objective      Physical Exam  Constitutional:       General: She is not in acute distress.     Appearance: She is well-developed.   Pulmonary:      Effort: Pulmonary effort is normal.   Neurological:      Mental Status: She is alert.   Psychiatric:         Attention and Perception: She is attentive.         Speech: Speech normal.             Assessment & Plan  Viral illness  - presume influenza A d/t prevalence in the community  - Anticipatory guidance. Discussed supportive care and emergent S&S.      Orders:    ondansetron (ZOFRAN)  8 MG tablet; Take 1 tablet by mouth Every 8 (Eight) Hours As Needed for Nausea or Vomiting for up to 10 days.    Metabolic syndrome X  - discussed adjusting administration of Zepbound given her current illness. If she feels well on Thursday she can proceed w/ injection. If not, then I advised her it is ok to wait until Sunday to administer.       PCOS (polycystic ovarian syndrome)  - discussed adjusting administration of Zepbound given her current illness. If she feels well on Thursday she can proceed w/ injection. If not, then I advised her it is ok to wait until Sunday to administer.               Medications, including side effects, were discussed with the patient. Patient verbalized understanding.  The plan of care was discussed. All questions were answered. Patient verbalized understanding.        No follow-ups on file.

## 2025-02-18 ENCOUNTER — TELEPHONE (OUTPATIENT)
Dept: INTERNAL MEDICINE | Facility: CLINIC | Age: 30
End: 2025-02-18

## 2025-02-18 NOTE — TELEPHONE ENCOUNTER
Hub staff attempted to follow warm transfer process and was unsuccessful     Caller: Marina Mitchell    Relationship to patient: Self    Best call back number: [phone]     Patient is needing:   PATIENT CALLED BACK IN TO CHECK ON THE STATUS OF THE MEDICATION REQUEST. PLEASE CALL TO SCHEDULE APPT OR FOLLOW UP.

## 2025-02-18 NOTE — TELEPHONE ENCOUNTER
Caller: Marina Mitchell    Relationship: Self    Best call back number: [phone]       What are your current symptoms: LOW GRADE FEVER, COUGH NOT NOTICEABLE DRAINAGE, SORE TENDER THROAT    How long have you been experiencing symptoms: 1 DAY  If a prescription is needed, what is your preferred pharmacy and phone number: [facility] [id number] - [place], [place] - [address] - [phone] [facility] [phone] [facility]     Additional notes:  PLEASE CALL TO ADVISE

## 2025-02-19 NOTE — TELEPHONE ENCOUNTER
With 1 day of symptoms her symptoms are likely caused by a virus.  Rec otc meds like flonase and sudafed to help with congestion.

## 2025-03-02 ENCOUNTER — HOSPITAL ENCOUNTER (OUTPATIENT)
Facility: HOSPITAL | Age: 30
Setting detail: OBSERVATION
Discharge: HOME | End: 2025-03-04
Attending: EMERGENCY MEDICINE | Admitting: HOSPITALIST
Payer: COMMERCIAL

## 2025-03-02 DIAGNOSIS — R74.01 ELEVATED TRANSAMINASE LEVEL: ICD-10-CM

## 2025-03-02 DIAGNOSIS — O36.80X0 ENCOUNTER TO DETERMINE FETAL VIABILITY OF PREGNANCY, SINGLE OR UNSPECIFIED FETUS: ICD-10-CM

## 2025-03-02 DIAGNOSIS — R10.13 EPIGASTRIC PAIN: Primary | ICD-10-CM

## 2025-03-02 DIAGNOSIS — R11.2 NAUSEA AND VOMITING, UNSPECIFIED VOMITING TYPE: ICD-10-CM

## 2025-03-02 LAB
B-HCG UR QL: POSITIVE
BACTERIA UR QL AUTO: ABNORMAL /HPF
BASOPHILS # BLD AUTO: 0.07 10*3/MM3 (ref 0–0.2)
BASOPHILS NFR BLD AUTO: 0.5 % (ref 0–1.5)
BILIRUB UR QL STRIP: NEGATIVE
CLARITY UR: CLEAR
COLOR UR: YELLOW
DEPRECATED RDW RBC AUTO: 42 FL (ref 37–54)
EOSINOPHIL # BLD AUTO: 0.18 10*3/MM3 (ref 0–0.4)
EOSINOPHIL NFR BLD AUTO: 1.4 % (ref 0.3–6.2)
ERYTHROCYTE [DISTWIDTH] IN BLOOD BY AUTOMATED COUNT: 13 % (ref 12.3–15.4)
GLUCOSE UR STRIP-MCNC: NEGATIVE MG/DL
HCG INTACT+B SERPL-ACNC: 1841 MIU/ML
HCT VFR BLD AUTO: 41.4 % (ref 34–46.6)
HGB BLD-MCNC: 13.9 G/DL (ref 12–15.9)
HGB UR QL STRIP.AUTO: NEGATIVE
HYALINE CASTS UR QL AUTO: ABNORMAL /LPF
IMM GRANULOCYTES # BLD AUTO: 0.05 10*3/MM3 (ref 0–0.05)
IMM GRANULOCYTES NFR BLD AUTO: 0.4 % (ref 0–0.5)
KETONES UR QL STRIP: NEGATIVE
LEUKOCYTE ESTERASE UR QL STRIP.AUTO: ABNORMAL
LYMPHOCYTES # BLD AUTO: 4.49 10*3/MM3 (ref 0.7–3.1)
LYMPHOCYTES NFR BLD AUTO: 33.8 % (ref 19.6–45.3)
MCH RBC QN AUTO: 30 PG (ref 26.6–33)
MCHC RBC AUTO-ENTMCNC: 33.6 G/DL (ref 31.5–35.7)
MCV RBC AUTO: 89.4 FL (ref 79–97)
MONOCYTES # BLD AUTO: 0.56 10*3/MM3 (ref 0.1–0.9)
MONOCYTES NFR BLD AUTO: 4.2 % (ref 5–12)
NEUTROPHILS NFR BLD AUTO: 59.7 % (ref 42.7–76)
NEUTROPHILS NFR BLD AUTO: 7.93 10*3/MM3 (ref 1.7–7)
NITRITE UR QL STRIP: NEGATIVE
NRBC BLD AUTO-RTO: 0 /100 WBC (ref 0–0.2)
PH UR STRIP.AUTO: 7 [PH] (ref 4.5–8)
PLATELET # BLD AUTO: 326 10*3/MM3 (ref 140–450)
PMV BLD AUTO: 9.4 FL (ref 6–12)
PROT UR QL STRIP: NEGATIVE
RBC # BLD AUTO: 4.63 10*6/MM3 (ref 3.77–5.28)
RBC # UR STRIP: ABNORMAL /HPF
REF LAB TEST METHOD: ABNORMAL
SP GR UR STRIP: 1.01 (ref 1–1.03)
SQUAMOUS #/AREA URNS HPF: ABNORMAL /HPF
UROBILINOGEN UR QL STRIP: ABNORMAL
WBC # UR STRIP: ABNORMAL /HPF
WBC NRBC COR # BLD AUTO: 13.28 10*3/MM3 (ref 3.4–10.8)

## 2025-03-02 PROCEDURE — 25810000003 SODIUM CHLORIDE 0.9 % SOLUTION: Performed by: EMERGENCY MEDICINE

## 2025-03-02 PROCEDURE — 83690 ASSAY OF LIPASE: CPT | Performed by: EMERGENCY MEDICINE

## 2025-03-02 PROCEDURE — 80053 COMPREHEN METABOLIC PANEL: CPT | Performed by: EMERGENCY MEDICINE

## 2025-03-02 PROCEDURE — 84702 CHORIONIC GONADOTROPIN TEST: CPT | Performed by: EMERGENCY MEDICINE

## 2025-03-02 PROCEDURE — 85025 COMPLETE CBC W/AUTO DIFF WBC: CPT | Performed by: EMERGENCY MEDICINE

## 2025-03-02 PROCEDURE — 81001 URINALYSIS AUTO W/SCOPE: CPT | Performed by: EMERGENCY MEDICINE

## 2025-03-02 PROCEDURE — 99285 EMERGENCY DEPT VISIT HI MDM: CPT | Performed by: EMERGENCY MEDICINE

## 2025-03-02 PROCEDURE — 81025 URINE PREGNANCY TEST: CPT | Performed by: EMERGENCY MEDICINE

## 2025-03-02 RX ORDER — FENTANYL CITRATE 50 UG/ML
50 INJECTION, SOLUTION INTRAMUSCULAR; INTRAVENOUS ONCE
Status: DISCONTINUED | OUTPATIENT
Start: 2025-03-02 | End: 2025-03-03

## 2025-03-02 RX ORDER — ONDANSETRON 2 MG/ML
8 INJECTION INTRAMUSCULAR; INTRAVENOUS ONCE
Status: DISCONTINUED | OUTPATIENT
Start: 2025-03-02 | End: 2025-03-04 | Stop reason: HOSPADM

## 2025-03-02 RX ADMIN — SODIUM CHLORIDE 1000 ML: 9 INJECTION, SOLUTION INTRAVENOUS at 23:36

## 2025-03-03 ENCOUNTER — APPOINTMENT (OUTPATIENT)
Dept: ULTRASOUND IMAGING | Facility: HOSPITAL | Age: 30
End: 2025-03-03
Payer: COMMERCIAL

## 2025-03-03 PROBLEM — R10.9 ABDOMINAL PAIN: Status: ACTIVE | Noted: 2025-03-03

## 2025-03-03 LAB
ALBUMIN SERPL-MCNC: 4.4 G/DL (ref 3.5–5.2)
ALBUMIN/GLOB SERPL: 1.5 G/DL
ALP SERPL-CCNC: 58 U/L (ref 39–117)
ALT SERPL W P-5'-P-CCNC: 59 U/L (ref 1–33)
ANION GAP SERPL CALCULATED.3IONS-SCNC: 13 MMOL/L (ref 5–15)
AST SERPL-CCNC: 53 U/L (ref 1–32)
BASOPHILS # BLD AUTO: 0.05 10*3/MM3 (ref 0–0.2)
BASOPHILS NFR BLD AUTO: 0.5 % (ref 0–1.5)
BILIRUB SERPL-MCNC: 0.4 MG/DL (ref 0–1.2)
BUN SERPL-MCNC: 7 MG/DL (ref 6–20)
BUN/CREAT SERPL: 9.3 (ref 7–25)
CALCIUM SPEC-SCNC: 9.9 MG/DL (ref 8.6–10.5)
CHLORIDE SERPL-SCNC: 101 MMOL/L (ref 98–107)
CO2 SERPL-SCNC: 23 MMOL/L (ref 22–29)
CREAT SERPL-MCNC: 0.75 MG/DL (ref 0.57–1)
DEPRECATED RDW RBC AUTO: 42.5 FL (ref 37–54)
EGFRCR SERPLBLD CKD-EPI 2021: 111.4 ML/MIN/1.73
EOSINOPHIL # BLD AUTO: 0.15 10*3/MM3 (ref 0–0.4)
EOSINOPHIL NFR BLD AUTO: 1.4 % (ref 0.3–6.2)
ERYTHROCYTE [DISTWIDTH] IN BLOOD BY AUTOMATED COUNT: 12.9 % (ref 12.3–15.4)
GLOBULIN UR ELPH-MCNC: 3 GM/DL
GLUCOSE SERPL-MCNC: 103 MG/DL (ref 65–99)
HCT VFR BLD AUTO: 40.9 % (ref 34–46.6)
HGB BLD-MCNC: 13.5 G/DL (ref 12–15.9)
IMM GRANULOCYTES # BLD AUTO: 0.03 10*3/MM3 (ref 0–0.05)
IMM GRANULOCYTES NFR BLD AUTO: 0.3 % (ref 0–0.5)
LIPASE SERPL-CCNC: 42 U/L (ref 13–60)
LYMPHOCYTES # BLD AUTO: 3.4 10*3/MM3 (ref 0.7–3.1)
LYMPHOCYTES NFR BLD AUTO: 31.6 % (ref 19.6–45.3)
MCH RBC QN AUTO: 29.7 PG (ref 26.6–33)
MCHC RBC AUTO-ENTMCNC: 33 G/DL (ref 31.5–35.7)
MCV RBC AUTO: 90.1 FL (ref 79–97)
MONOCYTES # BLD AUTO: 0.59 10*3/MM3 (ref 0.1–0.9)
MONOCYTES NFR BLD AUTO: 5.5 % (ref 5–12)
NEUTROPHILS NFR BLD AUTO: 6.53 10*3/MM3 (ref 1.7–7)
NEUTROPHILS NFR BLD AUTO: 60.7 % (ref 42.7–76)
NRBC BLD AUTO-RTO: 0 /100 WBC (ref 0–0.2)
PLATELET # BLD AUTO: 290 10*3/MM3 (ref 140–450)
PMV BLD AUTO: 9.5 FL (ref 6–12)
POTASSIUM SERPL-SCNC: 3.6 MMOL/L (ref 3.5–5.2)
PROT SERPL-MCNC: 7.4 G/DL (ref 6–8.5)
RBC # BLD AUTO: 4.54 10*6/MM3 (ref 3.77–5.28)
SODIUM SERPL-SCNC: 137 MMOL/L (ref 136–145)
WBC NRBC COR # BLD AUTO: 10.75 10*3/MM3 (ref 3.4–10.8)

## 2025-03-03 PROCEDURE — 85025 COMPLETE CBC W/AUTO DIFF WBC: CPT | Performed by: HOSPITALIST

## 2025-03-03 PROCEDURE — 25810000003 SODIUM CHLORIDE 0.9 % SOLUTION: Performed by: FAMILY MEDICINE

## 2025-03-03 PROCEDURE — 99223 1ST HOSP IP/OBS HIGH 75: CPT | Performed by: FAMILY MEDICINE

## 2025-03-03 PROCEDURE — G0378 HOSPITAL OBSERVATION PER HR: HCPCS

## 2025-03-03 PROCEDURE — 76705 ECHO EXAM OF ABDOMEN: CPT

## 2025-03-03 PROCEDURE — 94799 UNLISTED PULMONARY SVC/PX: CPT

## 2025-03-03 RX ORDER — PRENATAL VIT/IRON FUM/FOLIC AC 27MG-0.8MG
1 TABLET ORAL DAILY
Status: DISCONTINUED | OUTPATIENT
Start: 2025-03-03 | End: 2025-03-04 | Stop reason: HOSPADM

## 2025-03-03 RX ORDER — ACETAMINOPHEN 160 MG/5ML
650 SOLUTION ORAL EVERY 4 HOURS PRN
Status: DISCONTINUED | OUTPATIENT
Start: 2025-03-03 | End: 2025-03-04 | Stop reason: HOSPADM

## 2025-03-03 RX ORDER — SODIUM CHLORIDE 0.9 % (FLUSH) 0.9 %
10 SYRINGE (ML) INJECTION AS NEEDED
Status: DISCONTINUED | OUTPATIENT
Start: 2025-03-03 | End: 2025-03-04 | Stop reason: HOSPADM

## 2025-03-03 RX ORDER — SODIUM CHLORIDE 9 MG/ML
40 INJECTION, SOLUTION INTRAVENOUS AS NEEDED
Status: DISCONTINUED | OUTPATIENT
Start: 2025-03-03 | End: 2025-03-04 | Stop reason: HOSPADM

## 2025-03-03 RX ORDER — ONDANSETRON 4 MG/1
4 TABLET, ORALLY DISINTEGRATING ORAL EVERY 6 HOURS PRN
Status: DISCONTINUED | OUTPATIENT
Start: 2025-03-03 | End: 2025-03-04 | Stop reason: HOSPADM

## 2025-03-03 RX ORDER — ONDANSETRON 2 MG/ML
4 INJECTION INTRAMUSCULAR; INTRAVENOUS EVERY 6 HOURS PRN
Status: DISCONTINUED | OUTPATIENT
Start: 2025-03-03 | End: 2025-03-04 | Stop reason: HOSPADM

## 2025-03-03 RX ORDER — SODIUM CHLORIDE 0.9 % (FLUSH) 0.9 %
10 SYRINGE (ML) INJECTION EVERY 12 HOURS SCHEDULED
Status: DISCONTINUED | OUTPATIENT
Start: 2025-03-03 | End: 2025-03-04 | Stop reason: HOSPADM

## 2025-03-03 RX ORDER — ACETAMINOPHEN 650 MG/1
650 SUPPOSITORY RECTAL EVERY 4 HOURS PRN
Status: DISCONTINUED | OUTPATIENT
Start: 2025-03-03 | End: 2025-03-04 | Stop reason: HOSPADM

## 2025-03-03 RX ORDER — SODIUM CHLORIDE 9 MG/ML
100 INJECTION, SOLUTION INTRAVENOUS CONTINUOUS
Status: DISCONTINUED | OUTPATIENT
Start: 2025-03-03 | End: 2025-03-03

## 2025-03-03 RX ORDER — ACETAMINOPHEN 325 MG/1
650 TABLET ORAL EVERY 4 HOURS PRN
Status: DISCONTINUED | OUTPATIENT
Start: 2025-03-03 | End: 2025-03-04 | Stop reason: HOSPADM

## 2025-03-03 RX ADMIN — SODIUM CHLORIDE 100 ML/HR: 9 INJECTION, SOLUTION INTRAVENOUS at 03:16

## 2025-03-03 RX ADMIN — Medication 10 ML: at 03:16

## 2025-03-03 RX ADMIN — Medication 10 ML: at 20:19

## 2025-03-03 RX ADMIN — PRENATAL VIT W/ FE FUMARATE-FA TAB 27-0.8 MG 1 TABLET: 27-0.8 TAB at 17:10

## 2025-03-03 RX ADMIN — Medication 10 ML: at 09:17

## 2025-03-03 NOTE — PLAN OF CARE
Goal Outcome Evaluation:  Plan of Care Reviewed With: patient        Progress: no change  Outcome Evaluation: New ER admit, VSS, awaiting gallbladder U/S, good output

## 2025-03-03 NOTE — ED PROVIDER NOTES
Radiology to schedule your CT scan [phone].    Fasting Labs Soon  · Please come A FEW DAYS PRIOR TO NEXT VISIT  · Please come fasting (at least 8 hours) to recheck labs.  · Please drink plenty of water prior.  · You can take any prescribed or routine medicine with water prior.  · You can brush your teeth even if you are fasting.         Subjective   History of Present Illness    Review of Systems    History reviewed. No pertinent past medical history.    No Known Allergies    History reviewed. No pertinent surgical history.    Family History   Problem Relation Age of Onset    Lung cancer Mother     Lupus Mother     Drug abuse Mother     Prostate cancer Father     Seizures Father     Cervical cancer Maternal Grandmother         Cervical    Heart failure Paternal Grandmother     No Known Problems Half-Brother     No Known Problems Half-Sister        Social History     Socioeconomic History    Marital status: Single   Tobacco Use    Smoking status: Never     Passive exposure: Never    Smokeless tobacco: Never   Vaping Use    Vaping status: Never Used   Substance and Sexual Activity    Alcohol use: Not Currently     Comment: Maybe when or to dinner or special occasion    Drug use: Never    Sexual activity: Yes     Partners: Male     Birth control/protection: None           Objective   Physical Exam    Procedures           ED Course                                                       Medical Decision Making  Labs Reviewed  COMPREHENSIVE METABOLIC PANEL - Abnormal; Notable for the following components:     Glucose                       103 (*)                ALT (SGPT)                    59 (*)                 AST (SGOT)                    53 (*)              All other components within normal limits         Narrative: GFR Categories in Chronic Kidney Disease (CKD)                                      GFR Category          GFR (mL/min/1.73)    Interpretation                  G1                     90 or greater         Normal or high (1)                  G2                      60-89                Mild decrease (1)                  G3a                   45-59                Mild to moderate decrease                  G3b                   30-44                Moderate to severe decrease                  G4                    15-29                Severe  decrease                  G5                    14 or less           Kidney failure                                          (1)In the absence of evidence of kidney disease, neither GFR category G1 or G2 fulfill the criteria for CKD.                                    eGFR calculation 2021 CKD-EPI creatinine equation, which does not include race as a factor  PREGNANCY, URINE - Abnormal; Notable for the following components:     HCG, Urine QL                 Positive (*)            All other components within normal limits  URINALYSIS W/ MICROSCOPIC IF INDICATED (NO CULTURE) - Abnormal; Notable for the following components:     Leuk Esterase, UA               (*)               All other components within normal limits  CBC WITH AUTO DIFFERENTIAL - Abnormal; Notable for the following components:     WBC                           13.28 (*)               Monocyte %                    4.2 (*)                Neutrophils, Absolute         7.93 (*)               Lymphocytes, Absolute         4.49 (*)            All other components within normal limits  URINALYSIS, MICROSCOPIC ONLY - Abnormal; Notable for the following components:     WBC, UA                       3-5 (*)                Bacteria, UA                  Trace (*)               Squamous Epithelial Cells, UA     (*)               All other components within normal limits  LIPASE - Normal  HCG, QUANTITATIVE, PREGNANCY         Narrative: HCG Ranges by Gestational Age                                    Females - non-pregnant premenopausal   </= 1mIU/mL HCG                  Females - postmenopausal               </= 7mIU/mL HCG                                    3 Weeks       5.4   -      72 mIU/mL                  4 Weeks      10.2   -     708 mIU/mL                  5 Weeks       217   -   8,245 mIU/mL                  6 Weeks       152   -  32,177 mIU/mL                  7 Weeks     4,059   - 153,767 mIU/mL                  8 Weeks    31,366   - 149,094 mIU/mL                   9 Weeks    59,109   - 135,901 mIU/mL                  10 Weeks   44,186   - 170,409 mIU/mL                  12 Weeks   27,107   - 201,615 mIU/mL                  14 Weeks   24,302   -  93,646 mIU/mL                  15 Weeks   12,540   -  69,747 mIU/mL                  16 Weeks    8,904   -  55,332 mIU/mL                  17 Weeks    8,240   -  51,793 mIU/mL                  18 Weeks    9,649   -  55,271 mIU/mL                    CBC AND DIFFERENTIAL      Problems Addressed:  Elevated transaminase level: complicated acute illness or injury  Epigastric pain: complicated acute illness or injury  Nausea and vomiting, unspecified vomiting type: complicated acute illness or injury    Amount and/or Complexity of Data Reviewed  Labs: ordered.    Risk  Prescription drug management.  Decision regarding hospitalization.        Final diagnoses:   Epigastric pain   Elevated transaminase level   Nausea and vomiting, unspecified vomiting type       ED Disposition  ED Disposition       ED Disposition   Decision to Admit    Condition   --    Comment   Level of Care: Med/Surg [1]   Diagnosis: Abdominal pain [331823]   Admitting Physician: PHYLLIS SANTIAGO [[id number]]   Attending Physician: PHYLLIS SANTIAGO [[id number]]                 No follow-up provider specified.       Medication List      No changes were made to your prescriptions during this visit.            Antoine Alexander MD  03/03/25 0029

## 2025-03-03 NOTE — CASE MANAGEMENT/SOCIAL WORK
Discharge Planning Assessment   Darlene     Patient Name: Marina Mitchell  MRN: [id number]  Today's Date: 3/3/2025    Admit Date: 3/2/2025    Plan: Home with    Discharge Needs Assessment       Row Name 03/03/25 1043       Living Environment    People in Home spouse    Name(s) of People in Home prisca Becerril    Current Living Arrangements apartment    Duration at Residence 3 Y    Potentially Unsafe Housing Conditions none    In the past 12 months has the electric, gas, oil, or water company threatened to shut off services in your home? No    Primary Care Provided by self    Provides Primary Care For no one    Caregiving Concerns pt voiced no care giving concerns at this time.    Family Caregiver if Needed spouse    Family Caregiver Names Jone, prisca    Quality of Family Relationships helpful;involved;supportive    Able to Return to Prior Arrangements yes    Living Arrangement Comments Patient states she lives with her  in a first floor apartment with 5 steps and handrail to gain entry       Resource/Environmental Concerns    Resource/Environmental Concerns none    Transportation Concerns none       Transportation Needs    In the past 12 months, has lack of transportation kept you from medical appointments or from getting medications? no    In the past 12 months, has lack of transportation kept you from meetings, work, or from getting things needed for daily living? No       Food Insecurity    Within the past 12 months, you worried that your food would run out before you got the money to buy more. Never true    Within the past 12 months, the food you bought just didn't last and you didn't have money to get more. Never true       Transition Planning    Patient/Family Anticipates Transition to home with family    Patient/Family Anticipated Services at Transition none    Transportation Anticipated family or friend will provide  pt states her  will be able to provide ride home at discharge        Discharge Needs Assessment    Readmission Within the Last 30 Days no previous admission in last 30 days    Current Outpatient/Agency/Support Group --  none    Equipment Currently Used at Home none  pt states she has a glucometer at home but does not currently use it    Concerns to be Addressed denies needs/concerns at this time    Concerns Comments pt voiced no discharge needs at this time.    Anticipated Changes Related to Illness none    Equipment Needed After Discharge none    Outpatient/Agency/Support Group Needs --  pt declines the need for these services at this time.    Discharge Facility/Level of Care Needs --  pt declines need for these services at this time.    Provided Post Acute Provider List? Refused    Refused Provider List Comment pt declined    Patient's Choice of Community Agency(s) none    Current Discharge Risk --  none    Discharge Coordination/Progress Patient states she plans on returning home at discharge with her  to help as needed and voiced no discharge needs at this time.                   Discharge Plan       Row Name 03/03/25 1048       Plan    Plan Home with     Patient/Family in Agreement with Plan yes    Plan Comments Into room and introduced self and role of CM. Discussed discharge disposition with patient and her  Jone at bedside. Patient confirms the info on her face sheet is correct and she see's Dr. Precious Suárez as PCP. She states she uses [facility] in [place] and currently has no problem picking up or paying for her meds. She also states she does not have a living will and declines information regarding one. Patient states she lives with  her  in a first floor apartment with five steps and handrail to gain entry and normally has no issues entering the home or maneuvering inside. She states she is independent with her ADL's, works and drives and her  will be able to provide ride home at discharge. She also states she has a glucometer  at home that she does not use regularly and does not anticipate needing any other equipment at discharge. Patient states she has not used home health in the past and states she does not think she will need this service at discharge and declined the need for any other services such as STR or LT at this time. Patient states she plans on returning home at discharge with her  to help as needed and voiced no discharge needs at this time. She had no other questions. CM will follow.                  Continued Care and Services - Admitted Since 3/2/2025    No active coordination exists for this encounter.          Demographic Summary    No documentation.                  Functional Status    No documentation.                  Psychosocial    No documentation.                  Abuse/Neglect    No documentation.                  Legal    No documentation.                  Substance Abuse    No documentation.                  Patient Forms    No documentation.                     Mabel Nunez RN

## 2025-03-03 NOTE — PLAN OF CARE
Goal Outcome Evaluation:  Plan of Care Reviewed With: patient        Progress: improving  Outcome Evaluation: Vss. Tolerating regular diet. C/o dull right upper quad pain.

## 2025-03-03 NOTE — ED PROVIDER NOTES
Subjective   History of Present Illness    Chief complaint: Abdominal pain  Location: Right upper quad    Quality/Severity: Moderate    Timing/Onset/Duration: Last night, and again tonight    Modifying Factors: Worse after eating taco    Associated Symptoms: No headache.  No fever chills or cough.  No sore throat earache or nasal congestion.  No chest pain shortness of breath.  No diarrhea or burning when she urinates.  No vaginal bleeding or discharge.  Patient has had nausea and vomiting.    Narrative: This 29-year-old presents with epigastric abdominal pain that has caused intermittent nausea.  Patient has a history of gallbladder problems.  Patient states this feels similar.  Patient was seen at [facility] ED last evening and given antibiotics for UTI but no attention was given to her gallbladder.  Patient is 4 to 5 weeks pregnant.    PCP:Precious Suárez MD    OB/GYN: Able      Review of Systems   Constitutional:  Negative for chills and fever.   HENT:  Positive for sore throat (From vomiting). Negative for congestion.    Cardiovascular:  Negative for chest pain.   Gastrointestinal:  Positive for abdominal pain, nausea and vomiting. Negative for diarrhea.   Genitourinary:  Negative for difficulty urinating.   Musculoskeletal:  Negative for back pain.   Skin:  Negative for rash.   Neurological:  Negative for headaches.       History reviewed. No pertinent past medical history.    No Known Allergies    History reviewed. No pertinent surgical history.    Family History   Problem Relation Age of Onset    Lung cancer Mother     Lupus Mother     Drug abuse Mother     Prostate cancer Father     Seizures Father     Cervical cancer Maternal Grandmother         Cervical    Heart failure Paternal Grandmother     No Known Problems Half-Brother     No Known Problems Half-Sister        Social History     Socioeconomic History    Marital status: Single   Tobacco Use    Smoking status: Never     Passive exposure:  Never    Smokeless tobacco: Never   Vaping Use    Vaping status: Never Used   Substance and Sexual Activity    Alcohol use: Not Currently     Comment: Maybe when or to dinner or special occasion    Drug use: Never    Sexual activity: Yes     Partners: Male     Birth control/protection: None           Objective   Physical Exam  Vitals (The temperature is 98.4 °F, pulse 70, respirations 16, /73, room air pulse ox 99%.) reviewed.   Constitutional:       Appearance: She is well-developed.   HENT:      Head: Normocephalic and atraumatic.   Cardiovascular:      Rate and Rhythm: Normal rate and regular rhythm.      Heart sounds: Normal heart sounds. No murmur heard.     No friction rub. No gallop.   Pulmonary:      Effort: Pulmonary effort is normal.      Breath sounds: Normal breath sounds.   Abdominal:      General: Bowel sounds are normal.      Palpations: Abdomen is soft. There is no mass.      Tenderness: There is abdominal tenderness (Moderate right upper quadrant tenderness). There is no guarding or rebound.      Hernia: No hernia is present.   Skin:     General: Skin is warm and dry.   Neurological:      General: No focal deficit present.      Mental Status: She is alert and oriented to person, place, and time.         Procedures           ED Course      2300: The case was discussed and turned over to Dr. Alexander.  The laboratory values are pending.                                                 Medical Decision Making  Amount and/or Complexity of Data Reviewed  Labs: ordered.        Final diagnoses:   None       ED Disposition  ED Disposition       None            No follow-up provider specified.       Medication List      No changes were made to your prescriptions during this visit.       No orders to display     Labs Reviewed   COMPREHENSIVE METABOLIC PANEL   PREGNANCY, URINE   URINALYSIS W/ MICROSCOPIC IF INDICATED (NO CULTURE)   LIPASE   CBC WITH AUTO DIFFERENTIAL   CBC AND DIFFERENTIAL    Narrative:      The following orders were created for panel order CBC & Differential.  Procedure                               Abnormality         Status                     ---------                               -----------         ------                     CBC Auto Differential[[id number]]                                                         Please view results for these tests on the individual orders.     No results found.    Final diagnoses:   None         ED Medications:  Medications - No data to display    New Medications:     Medication List        ASK your doctor about these medications      prenatal (CLASSIC) vitamin 28-0.8 MG tablet tablet  Generic drug: prenatal vitamin     Tirzepatide-Weight Management 2.5 MG/0.5ML solution auto-injector  Commonly known as: ZEPBOUND  Inject 0.5 mL under the skin into the appropriate area as directed 1 (One) Time Per Week.              Stopped Medications:     Medication List        ASK your doctor about these medications      prenatal (CLASSIC) vitamin 28-0.8 MG tablet tablet  Generic drug: prenatal vitamin     Tirzepatide-Weight Management 2.5 MG/0.5ML solution auto-injector  Commonly known as: ZEPBOUND  Inject 0.5 mL under the skin into the appropriate area as directed 1 (One) Time Per Week.                   Aba Borrero MD  03/02/25 5108

## 2025-03-03 NOTE — ED TRIAGE NOTES
Patient reports epigastric abdominal pain that has caused intermittent nausea. She reports a history of gallbladder problems and that this feels similar.    She stats she was seen at [facility] ED last evening and given antibiotics for a UTI but no attention was given to her gallbladder.    She is alert, oriented, and in no distress. She has not taken any medications or treatments at home.

## 2025-03-03 NOTE — CONSULTS
[facility]   Consult Note    Patient Name: Marina Mitchell  : 1995  MRN: [id number]  Primary Care Physician:  Precious Suárez MD  Referring Physician: No ref. provider found  Date of admission: 3/2/2025    Inpatient Obstetrics / Gynecology Consult  Consult performed by: Ino Francois MD  Consult ordered by: Ar Adames MD        Subjective   Subjective     Reason for Consult/ Chief Complaint: epigastric pain    Abdominal Pain  Pertinent negatives include no dysuria, fever, headaches, nausea or vomiting.     Marina Mitchell is a 29 y.o. female  presenting with epigastric abdominal pain over this weekend.  She had a similar episode of pain like this when she was a teenager but had not had any similar symptoms until about a month ago.  Has had occasional multiple episode of midepigastric pain over the last month but a notably severe flare this past Saturday.  Some nausea and 1 episode of vomiting on Saturday this but no fevers.  Her last period was roughly 2025.  History of miscarriage last year.  No vaginal bleeding since conceiving.    Review of Systems   Constitutional:  Negative for chills and fever.   Respiratory:  Negative for shortness of breath.    Cardiovascular:  Negative for chest pain.   Gastrointestinal:  Positive for abdominal pain. Negative for nausea and vomiting.   Genitourinary:  Negative for dysuria, pelvic pain, vaginal bleeding and vaginal discharge.   Neurological:  Negative for headaches.        Personal History     History reviewed. No pertinent past medical history.    Past Surgical History:   Procedure Laterality Date    CLAVICLE SURGERY Right 10/31/2019       Family History: family history includes Cervical cancer in her maternal grandmother; Drug abuse in her mother; Heart failure in her paternal grandmother; Lung cancer in her mother; Lupus in her mother; No Known Problems in her half-brother and half-sister; Prostate cancer in her father; Seizures in her  father. Otherwise pertinent FHx was reviewed and not pertinent to current issue.    Social History:  reports that she has never smoked. She has never been exposed to tobacco smoke. She has never used smokeless tobacco. She reports that she does not currently use alcohol. She reports that she does not use drugs.    Home Medications:   Tirzepatide-Weight Management and prenatal vitamin    Allergies:  No Known Allergies    Objective    Objective     Vitals:  Temp:  [97.3 °F (36.3 °C)-98.4 °F (36.9 °C)] 98 °F (36.7 °C)  Heart Rate:  [70-86] 83  Resp:  [16-17] 17  BP: (102-137)/(52-78) 133/78    Physical Exam  Constitutional:       General: She is not in acute distress.     Appearance: Normal appearance. She is not ill-appearing.   Eyes:      Pupils: Pupils are equal, round, and reactive to light.   Cardiovascular:      Rate and Rhythm: Normal rate.   Pulmonary:      Effort: Pulmonary effort is normal. No respiratory distress.   Abdominal:      General: Abdomen is flat. There is no distension.      Tenderness: There is no guarding or rebound.      Comments: Discomfort with palpation of the epigastric region   Neurological:      General: No focal deficit present.      Mental Status: She is alert.   Psychiatric:         Mood and Affect: Mood normal.         Thought Content: Thought content normal.         Result Review    Result Review:  I have personally reviewed the results from the time of this admission to 3/3/2025 12:13 EST and agree with these findings:  [x]  Laboratory list / accordion  []  Microbiology  [x]  Radiology  []  EKG/Telemetry   []  Cardiology/Vascular   []  Pathology  []  Old records  []  Other:      Assessment & Plan   Assessment / Plan     Brief Patient Summary:  Marina Mitchell is a 29 y.o. year old  roughly 5wks by LMP presenting with abdominal pain    Active Hospital Problems:  Active Hospital Problems    Diagnosis     **Abdominal pain        Plan:   -RUQ ultrasound completed showing signs of  cholelithiasis without any evidence of cholecystitis.  Slight increase in white blood cell count and LFTs.  Pain improving for patient but full plan regarding epigastric pain per primary team   - if surgical intervention is needed, laparoscopic surgery ideally completed in second trimester but no contraindications to urgent/emergent surgery at any point in pregnancy.  -Plan to complete transvaginal ultrasound today to evaluate for signs of intrauterine pregnancy/establish dating.  -Beta-hCG 1841 (3/2/25), plan to trend in ~48hrs if ultrasound determines this is a pregnancy of unknown location.    Ino Francois MD  3/3/2025   12:23 EST

## 2025-03-03 NOTE — H&P
[facility]     PCP: Precious Suárez MD    CODE status: Full     CHIEF COMPLAINT: abdominal pain     HISTORY OF PRESENT ILLNESS:    30 y/o female, who is currently 5 weeks pregnant, presents to [facility] ED for evaluation of abdominal pain.  She says pain started on Friday night as an intense pain, mid epigastric to LUQ, sharp, with radiation to her back.  She had associated nausea and vomiting. She went to [facility] ED where they evaluated her, treated her with Tylenol, and told her she was pregnant,  and released her.  Her pain improved, and she was able to eat all day Saturday with no issues.  Yesterday, the pain flared up again, in same spot, so she decided to come here to get evaluated.  She  says the pain doesn't appear to be associated with eating, she denies fevers, chills, dysuria, or hematuria.  Currently, her pain has resolved and she has no nausea.  Patient states she  has some issues with her gallbladder around age 18, was told it was inflamed, but they opted not to remove it at that time.        History reviewed. No pertinent past medical history.  Past Surgical History:   Procedure Laterality Date    CLAVICLE SURGERY Right 10/31/2019     Family History   Problem Relation Age of Onset    Lung cancer Mother     Lupus Mother     Drug abuse Mother     Prostate cancer Father     Seizures Father     Cervical cancer Maternal Grandmother         Cervical    Heart failure Paternal Grandmother     No Known Problems Half-Brother     No Known Problems Half-Sister      Social History     Tobacco Use    Smoking status: Never     Passive exposure: Never    Smokeless tobacco: Never   Vaping Use    Vaping status: Never Used   Substance Use Topics    Alcohol use: Not Currently     Comment: Maybe when or to dinner or special occasion    Drug use: Never     Medications Prior to Admission   Medication Sig Dispense Refill Last Dose/Taking    prenatal vitamin (prenatal,  "CLASSIC, vitamin) tablet Take 1 tablet by mouth Daily.   3/2/2025 at  9:00 AM    Tirzepatide-Weight Management (ZEPBOUND) 2.5 MG/0.5ML solution auto-injector Inject 0.5 mL under the skin into the appropriate area as directed 1 (One) Time Per Week. 6 mL 1 2/28/2025     Allergies:  Patient has no known allergies.    Immunization History   Administered Date(s) Administered    31-influenza Vac Quardvalent Preservativ 09/19/2016, 10/09/2017, 10/01/2018    COVID-19 (MODERNA) 1st,2nd,3rd Dose Monovalent 01/05/2022, 02/02/2022    Fluzone (or Fluarix & Flulaval for VFC) >6mos 09/11/2020    Influenza, Unspecified 12/12/2012       REVIEW OF SYSTEMS:  Please see the above history of present illness for pertinent positives and negatives.  The remainder of the patient's systems have been reviewed and are negative.     Vital Signs  Temp:  [97.3 °F (36.3 °C)-98.4 °F (36.9 °C)] 97.3 °F (36.3 °C)  Heart Rate:  [70-86] 81  Resp:  [16] 16  BP: (111-137)/(57-73) 137/63  Flowsheet Rows      Flowsheet Row First Filed Value   Admission Height 160 cm (63\") Documented at 03/02/2025 2132   Admission Weight 117 kg (257 lb 15 oz) Documented at 03/02/2025 2132               Physical Exam:  General: Patient is awake and alert. Well developed and well nourished. No acute distress noted.   HENT: Head is atraumatic, normocephalic. Hearing is grossly intact. Nose is without obvious congestion and appears patent. Neck is supple and trachea is midline.   Eyes: Vision is grossly intact. Pupils appear equal and round.   Cardiovascular: RRR, S1-S2   Respiratory: Lungs are clear to ausculation without wheezes, rhonchi or rales.   Abdominal/GI: Morbidly obese, soft, NT, ND   Extremities: No peripheral edema noted.   Musculoskeletal: Spontaneous movement of bilateral upper and lower extremities against gravity noted. No signs of injury or deformity noted.   Skin: Warm and dry.   Psych: Mood and affect are appropriate. Cooperative with exam.   Neuro: No " facial asymmetry noted. No focal deficits noted, hearing and vision are grossly intact.           Results Review:    I reviewed the patient's new clinical results.  Lab Results (most recent)       Procedure Component Value Units Date/Time    Lipase [[id number]]  (Normal) Collected: 03/02/25 2336    Specimen: Blood Updated: 03/03/25 0021     Lipase 42 U/L     Comprehensive Metabolic Panel [[id number]]  (Abnormal) Collected: 03/02/25 2336    Specimen: Blood Updated: 03/03/25 0020     Glucose 103 mg/dL      BUN 7 mg/dL      Creatinine 0.75 mg/dL      Sodium 137 mmol/L      Potassium 3.6 mmol/L      Chloride 101 mmol/L      CO2 23.0 mmol/L      Calcium 9.9 mg/dL      Total Protein 7.4 g/dL      Albumin 4.4 g/dL      ALT (SGPT) 59 U/L      AST (SGOT) 53 U/L      Alkaline Phosphatase 58 U/L      Total Bilirubin 0.4 mg/dL      Globulin 3.0 gm/dL      A/G Ratio 1.5 g/dL      BUN/Creatinine Ratio 9.3     Anion Gap 13.0 mmol/L      eGFR 111.4 mL/min/1.73     Narrative:      GFR Categories in Chronic Kidney Disease (CKD)      GFR Category          GFR (mL/min/1.73)    Interpretation  G1                     90 or greater         Normal or high (1)  G2                      60-89                Mild decrease (1)  G3a                   45-59                Mild to moderate decrease  G3b                   30-44                Moderate to severe decrease  G4                    15-29                Severe decrease  G5                    14 or less           Kidney failure          (1)In the absence of evidence of kidney disease, neither GFR category G1 or G2 fulfill the criteria for CKD.    eGFR calculation 2021 CKD-EPI creatinine equation, which does not include race as a factor    hCG, Quantitative, Pregnancy [[id number]] Collected: 03/02/25 2336    Specimen: Blood Updated: 03/02/25 2359     HCG Quantitative 1,841.00 mIU/mL     Narrative:      HCG Ranges by Gestational Age    Females - non-pregnant premenopausal   </= 1mIU/mL  HCG  Females - postmenopausal               </= 7mIU/mL HCG    3 Weeks       5.4   -      72 mIU/mL  4 Weeks      10.2   -     708 mIU/mL  5 Weeks       217   -   8,245 mIU/mL  6 Weeks       152   -  32,177 mIU/mL  7 Weeks     4,059   - 153,767 mIU/mL  8 Weeks    31,366   - 149,094 mIU/mL  9 Weeks    59,109   - 135,901 mIU/mL  10 Weeks   44,186   - 170,409 mIU/mL  12 Weeks   27,107   - 201,615 mIU/mL  14 Weeks   24,302   -  93,646 mIU/mL  15 Weeks   12,540   -  69,747 mIU/mL  16 Weeks    8,904   -  55,332 mIU/mL  17 Weeks    8,240   -  51,793 mIU/mL  18 Weeks    9,649   -  55,271 mIU/mL      CBC & Differential [[id number]]  (Abnormal) Collected: 03/02/25 2336    Specimen: Blood Updated: 03/02/25 2344    Narrative:      The following orders were created for panel order CBC & Differential.  Procedure                               Abnormality         Status                     ---------                               -----------         ------                     CBC Auto Differential[[id number]]        Abnormal            Final result                 Please view results for these tests on the individual orders.    CBC Auto Differential [[id number]]  (Abnormal) Collected: 03/02/25 2336    Specimen: Blood Updated: 03/02/25 2344     WBC 13.28 10*3/mm3      RBC 4.63 10*6/mm3      Hemoglobin 13.9 g/dL      Hematocrit 41.4 %      MCV 89.4 fL      MCH 30.0 pg      MCHC 33.6 g/dL      RDW 13.0 %      RDW-SD 42.0 fl      MPV 9.4 fL      Platelets 326 10*3/mm3      Neutrophil % 59.7 %      Lymphocyte % 33.8 %      Monocyte % 4.2 %      Eosinophil % 1.4 %      Basophil % 0.5 %      Immature Grans % 0.4 %      Neutrophils, Absolute 7.93 10*3/mm3      Lymphocytes, Absolute 4.49 10*3/mm3      Monocytes, Absolute 0.56 10*3/mm3      Eosinophils, Absolute 0.18 10*3/mm3      Basophils, Absolute 0.07 10*3/mm3      Immature Grans, Absolute 0.05 10*3/mm3      nRBC 0.0 /100 WBC     Urinalysis, Microscopic Only - Urine, Clean Catch [[id number]]   (Abnormal) Collected: 03/02/25 2235    Specimen: Urine, Clean Catch Updated: 03/02/25 2244     RBC, UA None Seen /HPF      WBC, UA 3-5 /HPF      Bacteria, UA Trace /HPF      Squamous Epithelial Cells, UA Too Numerous to Count /HPF      Hyaline Casts, UA None Seen /LPF      Methodology Manual Light Microscopy    Pregnancy, Urine - Urine, Clean Catch [[id number]]  (Abnormal) Collected: 03/02/25 2235    Specimen: Urine, Clean Catch Updated: 03/02/25 2243     HCG, Urine QL Positive    Urinalysis With Microscopic If Indicated (No Culture) - Urine, Clean Catch [[id number]]  (Abnormal) Collected: 03/02/25 2235    Specimen: Urine, Clean Catch Updated: 03/02/25 2241     Color, UA Yellow     Appearance, UA Clear     pH, UA 7.0     Specific Gravity, UA 1.015     Glucose, UA Negative     Ketones, UA Negative     Bilirubin, UA Negative     Blood, UA Negative     Protein, UA Negative     Leuk Esterase, UA Moderate (2+)     Nitrite, UA Negative     Urobilinogen, UA 0.2 E.U./dL            Imaging Results (Most Recent)       None          reviewed    ECG/EMG Results (most recent)       None          reviewed    Assessment & Plan     Abdominal pain   With mild transaminitis   Unable to have CT abd/pelvis since she is 5 weeks pregnant  With  admit for RUQ US  Ok for regular diet   Prn IV Zofran, Tylenol for pain     Morbid obesity  Complicates all aspects of care      DVT ppx:  Bernard Ramachandran DO  03/03/25  01:31 EST        At [facility], we believe that sharing information builds trust and better relationships. You are receiving this note because you recently visited [facility]. It is possible you will see health information before a provider has talked with you about it. This kind of information can be easy to misunderstand. To help you fully understand what it means for your health, we urge you to discuss this note with your provider.

## 2025-03-04 ENCOUNTER — READMISSION MANAGEMENT (OUTPATIENT)
Dept: CALL CENTER | Facility: HOSPITAL | Age: 30
End: 2025-03-04
Payer: COMMERCIAL

## 2025-03-04 VITALS
HEIGHT: 63 IN | BODY MASS INDEX: 46.32 KG/M2 | SYSTOLIC BLOOD PRESSURE: 115 MMHG | OXYGEN SATURATION: 97 % | TEMPERATURE: 98 F | DIASTOLIC BLOOD PRESSURE: 53 MMHG | WEIGHT: 261.4 LBS | HEART RATE: 72 BPM | RESPIRATION RATE: 16 BRPM

## 2025-03-04 PROBLEM — K76.0 FATTY INFILTRATION OF LIVER: Status: ACTIVE | Noted: 2025-03-04

## 2025-03-04 LAB
ALBUMIN SERPL-MCNC: 3.8 G/DL (ref 3.5–5.2)
ALBUMIN/GLOB SERPL: 1.4 G/DL
ALP SERPL-CCNC: 47 U/L (ref 39–117)
ALT SERPL W P-5'-P-CCNC: 45 U/L (ref 1–33)
ANION GAP SERPL CALCULATED.3IONS-SCNC: 10.8 MMOL/L (ref 5–15)
AST SERPL-CCNC: 22 U/L (ref 1–32)
BILIRUB SERPL-MCNC: 0.4 MG/DL (ref 0–1.2)
BUN SERPL-MCNC: 8 MG/DL (ref 6–20)
BUN/CREAT SERPL: 12.3 (ref 7–25)
CALCIUM SPEC-SCNC: 9.1 MG/DL (ref 8.6–10.5)
CHLORIDE SERPL-SCNC: 106 MMOL/L (ref 98–107)
CO2 SERPL-SCNC: 21.2 MMOL/L (ref 22–29)
CREAT SERPL-MCNC: 0.65 MG/DL (ref 0.57–1)
EGFRCR SERPLBLD CKD-EPI 2021: 123.2 ML/MIN/1.73
GLOBULIN UR ELPH-MCNC: 2.8 GM/DL
GLUCOSE SERPL-MCNC: 107 MG/DL (ref 65–99)
HCG INTACT+B SERPL-ACNC: 2985 MIU/ML
POTASSIUM SERPL-SCNC: 3.9 MMOL/L (ref 3.5–5.2)
PROT SERPL-MCNC: 6.6 G/DL (ref 6–8.5)
SODIUM SERPL-SCNC: 138 MMOL/L (ref 136–145)

## 2025-03-04 PROCEDURE — G0378 HOSPITAL OBSERVATION PER HR: HCPCS

## 2025-03-04 PROCEDURE — 84702 CHORIONIC GONADOTROPIN TEST: CPT | Performed by: NURSE PRACTITIONER

## 2025-03-04 PROCEDURE — 80053 COMPREHEN METABOLIC PANEL: CPT | Performed by: FAMILY MEDICINE

## 2025-03-04 RX ADMIN — PRENATAL VIT W/ FE FUMARATE-FA TAB 27-0.8 MG 1 TABLET: 27-0.8 TAB at 09:03

## 2025-03-04 NOTE — PLAN OF CARE
Goal Outcome Evaluation:  Plan of Care Reviewed With: patient        Progress: improving  Outcome Evaluation: VSS, tolerating diet, rested some, anticipates discharge today

## 2025-03-04 NOTE — DISCHARGE INSTR - APPOINTMENTS
Patient has follow up with Dr. Suárez  Tomorrow March 5 @ 10:30 am  [phone]. This is the only opening they have within the next month.

## 2025-03-04 NOTE — DISCHARGE SUMMARY
Marina Mitchell  1995  [id number]    Hospitalists Discharge Summary    Date of Admission: 3/2/2025  Date of Discharge:  3/4/2025    Primary Discharge Diagnoses: ***    Secondary Discharge Diagnoses: ***      History of Present Illness:***    Hospital Course:      PCP  Patient Care Team:  Precious Suárez MD as PCP - General (Internal Medicine & Pediatrics)  Graham Gonzales DO as Consulting Physician (Obstetrics and Gynecology)    Consults:   Consults       Date and Time Order Name Status Description    3/3/2025 11:40 AM Inpatient Obstetrics / Gynecology Consult Completed             Operations and Procedures Performed:       US Gallbladder    Result Date: 3/3/2025  Narrative: US GALLBLADDER Date of Exam: 3/3/2025 8:19 AM EST Indication: RUQ abdominal pain; mild transaminitis. Comparison: No comparisons available. Technique: Grayscale and color Doppler ultrasound evaluation of the right upper quadrant was performed. Findings: LIVER:  The liver appears increased in echogenicity.No focal lesions identified. Normal flow is present within the hepatic vasculature. GALLBLADDER:  The gallbladder contains multiple stones without wall thickening or pericholecystic fluid. The common bile duct appears normal in caliber.  No positive sonographic Hall sign reported. PANCREAS:  Visualized portions are unremarkable. RIGHT KIDNEY:  Normal in size with no focal lesions. No evidence of nephrolithiasis or hydronephrosis. The right kidney measures 12.3 cm in qnzr-ki-vaje length.     Impression: Impression: 1. Cholelithiasis without sonographic evidence of cholecystitis. 2. Increased echogenicity of the liver suggestive of hepatic steatosis. Electronically Signed: Ophelia Salas MD  3/3/2025 9:07 AM EST  Workstation ID: [id number]     Allergies:  has No Known Allergies.    Trell  {Desc; reviewed/not reviewed:15543}    Discharge Medications:     Discharge Medications        Continue These Medications        Instructions  Start Date   prenatal (CLASSIC) vitamin 28-0.8 MG tablet tablet  Generic drug: prenatal vitamin   1 tablet, Daily             Stop These Medications      Tirzepatide-Weight Management 2.5 MG/0.5ML solution auto-injector  Commonly known as: ZEPBOUND              Last Lab Results:   Lab Results (most recent)       Procedure Component Value Units Date/Time    hCG, Quantitative, Pregnancy [[id number]] Collected: 03/04/25 0349    Specimen: Blood Updated: 03/04/25 0845     HCG Quantitative 2,985.00 mIU/mL     Narrative:      HCG Ranges by Gestational Age    Females - non-pregnant premenopausal   </= 1mIU/mL HCG  Females - postmenopausal               </= 7mIU/mL HCG    3 Weeks       5.4   -      72 mIU/mL  4 Weeks      10.2   -     708 mIU/mL  5 Weeks       217   -   8,245 mIU/mL  6 Weeks       152   -  32,177 mIU/mL  7 Weeks     4,059   - 153,767 mIU/mL  8 Weeks    31,366   - 149,094 mIU/mL  9 Weeks    59,109   - 135,901 mIU/mL  10 Weeks   44,186   - 170,409 mIU/mL  12 Weeks   27,107   - 201,615 mIU/mL  14 Weeks   24,302   -  93,646 mIU/mL  15 Weeks   12,540   -  69,747 mIU/mL  16 Weeks    8,904   -  55,332 mIU/mL  17 Weeks    8,240   -  51,793 mIU/mL  18 Weeks    9,649   -  55,271 mIU/mL      Comprehensive Metabolic Panel [[id number]]  (Abnormal) Collected: 03/04/25 0349    Specimen: Blood Updated: 03/04/25 0455     Glucose 107 mg/dL      BUN 8 mg/dL      Creatinine 0.65 mg/dL      Sodium 138 mmol/L      Potassium 3.9 mmol/L      Chloride 106 mmol/L      CO2 21.2 mmol/L      Calcium 9.1 mg/dL      Total Protein 6.6 g/dL      Albumin 3.8 g/dL      ALT (SGPT) 45 U/L      AST (SGOT) 22 U/L      Alkaline Phosphatase 47 U/L      Total Bilirubin 0.4 mg/dL      Globulin 2.8 gm/dL      A/G Ratio 1.4 g/dL      BUN/Creatinine Ratio 12.3     Anion Gap 10.8 mmol/L      eGFR 123.2 mL/min/1.73     Narrative:      GFR Categories in Chronic Kidney Disease (CKD)      GFR Category          GFR (mL/min/1.73)    Interpretation  G1                      90 or greater         Normal or high (1)  G2                      60-89                Mild decrease (1)  G3a                   45-59                Mild to moderate decrease  G3b                   30-44                Moderate to severe decrease  G4                    15-29                Severe decrease  G5                    14 or less           Kidney failure          (1)In the absence of evidence of kidney disease, neither GFR category G1 or G2 fulfill the criteria for CKD.    eGFR calculation 2021 CKD-EPI creatinine equation, which does not include race as a factor    CBC & Differential [[id number]]  (Abnormal) Collected: 03/03/25 1010    Specimen: Blood Updated: 03/03/25 1018    Narrative:      The following orders were created for panel order CBC & Differential.  Procedure                               Abnormality         Status                     ---------                               -----------         ------                     CBC Auto Differential[[id number]]        Abnormal            Final result                 Please view results for these tests on the individual orders.    CBC Auto Differential [[id number]]  (Abnormal) Collected: 03/03/25 1010    Specimen: Blood Updated: 03/03/25 1018     WBC 10.75 10*3/mm3      RBC 4.54 10*6/mm3      Hemoglobin 13.5 g/dL      Hematocrit 40.9 %      MCV 90.1 fL      MCH 29.7 pg      MCHC 33.0 g/dL      RDW 12.9 %      RDW-SD 42.5 fl      MPV 9.5 fL      Platelets 290 10*3/mm3      Neutrophil % 60.7 %      Lymphocyte % 31.6 %      Monocyte % 5.5 %      Eosinophil % 1.4 %      Basophil % 0.5 %      Immature Grans % 0.3 %      Neutrophils, Absolute 6.53 10*3/mm3      Lymphocytes, Absolute 3.40 10*3/mm3      Monocytes, Absolute 0.59 10*3/mm3      Eosinophils, Absolute 0.15 10*3/mm3      Basophils, Absolute 0.05 10*3/mm3      Immature Grans, Absolute 0.03 10*3/mm3      nRBC 0.0 /100 WBC     Lipase [[id number]]  (Normal) Collected: 03/02/25 0686    Specimen:  Blood Updated: 03/03/25 0021     Lipase 42 U/L     Comprehensive Metabolic Panel [[id number]]  (Abnormal) Collected: 03/02/25 2336    Specimen: Blood Updated: 03/03/25 0020     Glucose 103 mg/dL      BUN 7 mg/dL      Creatinine 0.75 mg/dL      Sodium 137 mmol/L      Potassium 3.6 mmol/L      Chloride 101 mmol/L      CO2 23.0 mmol/L      Calcium 9.9 mg/dL      Total Protein 7.4 g/dL      Albumin 4.4 g/dL      ALT (SGPT) 59 U/L      AST (SGOT) 53 U/L      Alkaline Phosphatase 58 U/L      Total Bilirubin 0.4 mg/dL      Globulin 3.0 gm/dL      A/G Ratio 1.5 g/dL      BUN/Creatinine Ratio 9.3     Anion Gap 13.0 mmol/L      eGFR 111.4 mL/min/1.73     Narrative:      GFR Categories in Chronic Kidney Disease (CKD)      GFR Category          GFR (mL/min/1.73)    Interpretation  G1                     90 or greater         Normal or high (1)  G2                      60-89                Mild decrease (1)  G3a                   45-59                Mild to moderate decrease  G3b                   30-44                Moderate to severe decrease  G4                    15-29                Severe decrease  G5                    14 or less           Kidney failure          (1)In the absence of evidence of kidney disease, neither GFR category G1 or G2 fulfill the criteria for CKD.    eGFR calculation 2021 CKD-EPI creatinine equation, which does not include race as a factor    hCG, Quantitative, Pregnancy [[id number]] Collected: 03/02/25 2336    Specimen: Blood Updated: 03/02/25 2359     HCG Quantitative 1,841.00 mIU/mL     Narrative:      HCG Ranges by Gestational Age    Females - non-pregnant premenopausal   </= 1mIU/mL HCG  Females - postmenopausal               </= 7mIU/mL HCG    3 Weeks       5.4   -      72 mIU/mL  4 Weeks      10.2   -     708 mIU/mL  5 Weeks       217   -   8,245 mIU/mL  6 Weeks       152   -  32,177 mIU/mL  7 Weeks     4,059   - 153,767 mIU/mL  8 Weeks    31,366   - 149,094 mIU/mL  9 Weeks    59,109   -  135,901 mIU/mL  10 Weeks   44,186   - 170,409 mIU/mL  12 Weeks   27,107   - 201,615 mIU/mL  14 Weeks   24,302   -  93,646 mIU/mL  15 Weeks   12,540   -  69,747 mIU/mL  16 Weeks    8,904   -  55,332 mIU/mL  17 Weeks    8,240   -  51,793 mIU/mL  18 Weeks    9,649   -  55,271 mIU/mL      CBC & Differential [[id number]]  (Abnormal) Collected: 03/02/25 2336    Specimen: Blood Updated: 03/02/25 2344    Narrative:      The following orders were created for panel order CBC & Differential.  Procedure                               Abnormality         Status                     ---------                               -----------         ------                     CBC Auto Differential[[id number]]        Abnormal            Final result                 Please view results for these tests on the individual orders.    CBC Auto Differential [[id number]]  (Abnormal) Collected: 03/02/25 2336    Specimen: Blood Updated: 03/02/25 2344     WBC 13.28 10*3/mm3      RBC 4.63 10*6/mm3      Hemoglobin 13.9 g/dL      Hematocrit 41.4 %      MCV 89.4 fL      MCH 30.0 pg      MCHC 33.6 g/dL      RDW 13.0 %      RDW-SD 42.0 fl      MPV 9.4 fL      Platelets 326 10*3/mm3      Neutrophil % 59.7 %      Lymphocyte % 33.8 %      Monocyte % 4.2 %      Eosinophil % 1.4 %      Basophil % 0.5 %      Immature Grans % 0.4 %      Neutrophils, Absolute 7.93 10*3/mm3      Lymphocytes, Absolute 4.49 10*3/mm3      Monocytes, Absolute 0.56 10*3/mm3      Eosinophils, Absolute 0.18 10*3/mm3      Basophils, Absolute 0.07 10*3/mm3      Immature Grans, Absolute 0.05 10*3/mm3      nRBC 0.0 /100 WBC     Urinalysis, Microscopic Only - Urine, Clean Catch [[id number]]  (Abnormal) Collected: 03/02/25 2235    Specimen: Urine, Clean Catch Updated: 03/02/25 2244     RBC, UA None Seen /HPF      WBC, UA 3-5 /HPF      Bacteria, UA Trace /HPF      Squamous Epithelial Cells, UA Too Numerous to Count /HPF      Hyaline Casts, UA None Seen /LPF      Methodology Manual Light Microscopy     Pregnancy, Urine - Urine, Clean Catch [[id number]]  (Abnormal) Collected: 03/02/25 2235    Specimen: Urine, Clean Catch Updated: 03/02/25 2243     HCG, Urine QL Positive    Urinalysis With Microscopic If Indicated (No Culture) - Urine, Clean Catch [[id number]]  (Abnormal) Collected: 03/02/25 2235    Specimen: Urine, Clean Catch Updated: 03/02/25 2241     Color, UA Yellow     Appearance, UA Clear     pH, UA 7.0     Specific Gravity, UA 1.015     Glucose, UA Negative     Ketones, UA Negative     Bilirubin, UA Negative     Blood, UA Negative     Protein, UA Negative     Leuk Esterase, UA Moderate (2+)     Nitrite, UA Negative     Urobilinogen, UA 0.2 E.U./dL          Imaging Results (Most Recent)       Procedure Component Value Units Date/Time    US Ob Transvaginal [[id number]] Resulted: 03/03/25 1310     Updated: 03/03/25 1323    US Gallbladder [[id number]] Collected: 03/03/25 0906     Updated: 03/03/25 0910    Narrative:      US GALLBLADDER    Date of Exam: 3/3/2025 8:19 AM EST    Indication: RUQ abdominal pain; mild transaminitis.    Comparison: No comparisons available.    Technique: Grayscale and color Doppler ultrasound evaluation of the right upper quadrant was performed.      Findings:  LIVER:  The liver appears increased in echogenicity.No focal lesions identified. Normal flow is present within the hepatic vasculature.     GALLBLADDER:  The gallbladder contains multiple stones without wall thickening or pericholecystic fluid. The common bile duct appears normal in caliber.  No positive sonographic Hall sign reported.    PANCREAS:  Visualized portions are unremarkable.    RIGHT KIDNEY:  Normal in size with no focal lesions. No evidence of nephrolithiasis or hydronephrosis. The right kidney measures 12.3 cm in qkqd-ej-ayxv length.          Impression:      Impression:    1. Cholelithiasis without sonographic evidence of cholecystitis.  2. Increased echogenicity of the liver suggestive of hepatic  steatosis.        Electronically Signed: Ophelia Salas MD    3/3/2025 9:07 AM EST    Workstation ID: [id number]            PROCEDURES      Condition on Discharge:  ***    Physical Exam at Discharge  Vital Signs  Temp:  [97.6 °F (36.4 °C)-98.7 °F (37.1 °C)] 98 °F (36.7 °C)  Heart Rate:  [70-83] 72  Resp:  [16-18] 16  BP: ()/(53-83) 115/53    Physical Exam:  Physical Exam   Constitutional: Patient appears well-developed and well-nourished and in no acute distress   HEENT:   Head: Normocephalic and atraumatic.   Eyes:  Pupils are equal, round, and reactive to light. EOM are intact. Sclera are anicteric and non-injected.  Mouth and Throat: Patient has moist mucous membranes. Oropharynx is clear of any erythema or exudate.     Neck: Neck supple. No JVD present. No thyromegaly present. No lymphadenopathy present.  Cardiovascular: Regular rate, regular rhythm, S1 normal and S2 normal.  Exam reveals no gallop and no friction rub.  No murmur heard.  Pulmonary/Chest: Lungs are clear to auscultation bilaterally. No respiratory distress. No wheezes. No rhonchi. No rales.   Abdominal: Soft. Bowel sounds are normal. No distension and no mass. There is no hepatosplenomegaly. There is no tenderness.   Musculoskeletal: Normal Muscle tone  Extremities: No edema. Pulses are palpable in all 4 extremities.  Neurological: Patient is alert and oriented to person, place, and time. Cranial nerves II-XII are grossly intact with no focal deficits.  Skin: Skin is warm. No rash noted. Nails show no clubbing.  No cyanosis or erythema.    Discharge Disposition  ***    Visiting Nurse:    {YES/NO:200010}    Home PT/OT:  {YES/NO:200010}    Home Safety Evaluation:  {YES/NO:200010}    DME  ***    Discharge Diet:      Dietary Orders (From admission, onward)       Start     Ordered    03/03/25 0130  Diet: Regular/House; Fluid Consistency: Thin (IDDSI 0)  Diet Effective Now        References:    Diet Order Definitions   Question Answer Comment    Diets: Regular/House    Fluid Consistency: Thin (IDDSI 0)        03/03/25 0136                    Activity at Discharge:  ***    Pre-discharge education  {Discharge Education:99250}      Follow-up Appointments  No future appointments.  Additional Instructions for the Follow-ups that You Need to Schedule       Discharge Follow-up with PCP   As directed       Currently Documented PCP:    Precious Suárez MD    PCP Phone Number:    [phone]     Follow Up Details: 1-2 weeks        Discharge Follow-up with Specified Provider: Graham Gonzales D.O. early next week   As directed      To: Graham Gonzales D.O. early next week                Test Results Pending at Discharge       Ar Adames MD  03/04/25  11:11 EST    Time: {Time spent:725907152} (if over 30 minutes give explanation as to why it took greater than 30 minutes)

## 2025-03-04 NOTE — OUTREACH NOTE
Prep Survey      Flowsheet Row Responses   [facility] patient discharged from? [place]   Is LACE score < 7 ? Yes   Eligibility [facility] [place]   Date of Admission 03/02/25   Date of Discharge 03/04/25   Discharge diagnosis Abdominal pain   Does the patient have one of the following disease processes/diagnoses(primary or secondary)? Other   Prep survey completed? Yes            Frances VARGAS - Registered Nurse

## 2025-03-05 ENCOUNTER — OFFICE VISIT (OUTPATIENT)
Dept: INTERNAL MEDICINE | Facility: CLINIC | Age: 30
End: 2025-03-05
Payer: COMMERCIAL

## 2025-03-05 ENCOUNTER — TRANSITIONAL CARE MANAGEMENT TELEPHONE ENCOUNTER (OUTPATIENT)
Dept: CALL CENTER | Facility: HOSPITAL | Age: 30
End: 2025-03-05
Payer: COMMERCIAL

## 2025-03-05 VITALS
DIASTOLIC BLOOD PRESSURE: 82 MMHG | OXYGEN SATURATION: 97 % | HEART RATE: 82 BPM | BODY MASS INDEX: 46.77 KG/M2 | TEMPERATURE: 98.4 F | WEIGHT: 264 LBS | SYSTOLIC BLOOD PRESSURE: 130 MMHG

## 2025-03-05 DIAGNOSIS — R82.90 ABNORMAL URINALYSIS: Primary | ICD-10-CM

## 2025-03-05 DIAGNOSIS — K80.20 GALLSTONES: ICD-10-CM

## 2025-03-05 DIAGNOSIS — R10.9 ABDOMINAL PAIN DURING PREGNANCY, FIRST TRIMESTER: ICD-10-CM

## 2025-03-05 DIAGNOSIS — O26.891 ABDOMINAL PAIN DURING PREGNANCY, FIRST TRIMESTER: ICD-10-CM

## 2025-03-05 LAB
BILIRUB BLD-MCNC: NEGATIVE MG/DL
CLARITY, POC: CLEAR
COLOR UR: YELLOW
EXPIRATION DATE: ABNORMAL
GLUCOSE UR STRIP-MCNC: NEGATIVE MG/DL
KETONES UR QL: NEGATIVE
LEUKOCYTE EST, POC: ABNORMAL
Lab: ABNORMAL
NITRITE UR-MCNC: NEGATIVE MG/ML
PH UR: 5.5 [PH] (ref 5–8)
PROT UR STRIP-MCNC: NEGATIVE MG/DL
RBC # UR STRIP: ABNORMAL /UL
SP GR UR: 1.02 (ref 1–1.03)
UROBILINOGEN UR QL: ABNORMAL

## 2025-03-05 RX ORDER — ONDANSETRON 4 MG/1
TABLET, ORALLY DISINTEGRATING ORAL
COMMUNITY
Start: 2025-03-02

## 2025-03-05 NOTE — PROGRESS NOTES
Marina Mitchell is a 29 y.o. female, who presents with a chief complaint of   Chief Complaint   Patient presents with    Hospital Follow Up Visit     Discharged from the ER 3-4-2025 for Abdominal Pain Was prescribed Macrobid and wants to make sure its safe with pregnancy before starting it            HPI   Pt here for hospital follow up.  She was found to have gallstones.  She is also pregnant.  She had a miscarriage in September. She has had some nausea.      Ua abnormal in ED and pt prescribed macrobid.  She was concerned about taking the med while pregnant.  No dysuria      The following portions of the patient's history were reviewed and updated as appropriate: allergies, current medications, past family history, past medical history, past social history, past surgical history and problem list.    Allergies: Patient has no known allergies.    Review of Systems   Constitutional: Negative.    HENT: Negative.     Eyes: Negative.    Respiratory: Negative.     Cardiovascular: Negative.    Gastrointestinal: Negative.    Endocrine: Negative.    Genitourinary: Negative.    Musculoskeletal: Negative.    Skin: Negative.    Allergic/Immunologic: Negative.    Neurological: Negative.    Hematological: Negative.    Psychiatric/Behavioral: Negative.     All other systems reviewed and are negative.            Wt Readings from Last 3 Encounters:   03/05/25 120 kg (264 lb)   03/03/25 119 kg (261 lb 6.4 oz)   02/03/25 117 kg (258 lb)     Temp Readings from Last 3 Encounters:   03/05/25 98.4 °F (36.9 °C) (Infrared)   03/04/25 98 °F (36.7 °C) (Oral)   10/07/24 98.6 °F (37 °C) (Infrared)     BP Readings from Last 3 Encounters:   03/05/25 130/82   03/04/25 115/53   10/07/24 120/90     Pulse Readings from Last 3 Encounters:   03/05/25 82   03/04/25 72   10/07/24 75     Body mass index is 46.77 kg/m².  SpO2 Readings from Last 3 Encounters:   03/05/25 97%   03/04/25 97%   10/07/24 98%          Physical Exam  Vitals and nursing note  reviewed.   Constitutional:       General: She is not in acute distress.     Appearance: She is well-developed.   HENT:      Head: Normocephalic and atraumatic.      Right Ear: External ear normal.      Left Ear: External ear normal.      Nose: Nose normal.   Eyes:      Conjunctiva/sclera: Conjunctivae normal.      Pupils: Pupils are equal, round, and reactive to light.   Cardiovascular:      Rate and Rhythm: Normal rate and regular rhythm.      Heart sounds: Normal heart sounds.   Pulmonary:      Effort: Pulmonary effort is normal. No respiratory distress.      Breath sounds: Normal breath sounds. No wheezing.   Musculoskeletal:         General: Normal range of motion.      Cervical back: Normal range of motion and neck supple.      Comments: Normal gait   Skin:     General: Skin is warm and dry.   Neurological:      Mental Status: She is alert and oriented to person, place, and time.   Psychiatric:         Behavior: Behavior normal.         Thought Content: Thought content normal.         Judgment: Judgment normal.         Results for orders placed or performed in visit on 03/05/25   POCT urinalysis dipstick, automated    Collection Time: 03/05/25 12:01 PM    Specimen: Urine   Result Value Ref Range    Color Yellow Yellow, Straw, Dark Yellow, Maryellen    Clarity, UA Clear Clear    Specific Gravity  1.020 1.005 - 1.030    pH, Urine 5.5 5.0 - 8.0    Leukocytes Trace (A) Negative    Nitrite, UA Negative Negative    Protein, POC Negative Negative mg/dL    Glucose, UA Negative Negative mg/dL    Ketones, UA Negative Negative    Urobilinogen, UA 0.2 E.U./dL Normal, 0.2 E.U./dL    Bilirubin Negative Negative    Blood, UA Trace (A) Negative    Lot Number [id number]     Expiration Date 04/10/2026      Result Review :       Data reviewed : Recent hospitalization notes ED notes and imaging below        Study Result    Narrative & Impression   Small gestational sac measuring 4.9 x 4.4 x 6.2 mm dating 5 weeks 2 days.  No yolk  sac or fetal pole identified.  Ovaries appear normal bilaterally.     Ino Francois MD  3/4/2025   15:09 EST        US Gallbladder     Result Date: 3/3/2025  Narrative: US GALLBLADDER Date of Exam: 3/3/2025 8:19 AM EST Indication: RUQ abdominal pain; mild transaminitis. Comparison: No comparisons available. Technique: Grayscale and color Doppler ultrasound evaluation of the right upper quadrant was performed. Findings: LIVER:  The liver appears increased in echogenicity.No focal lesions identified. Normal flow is present within the hepatic vasculature. GALLBLADDER:  The gallbladder contains multiple stones without wall thickening or pericholecystic fluid. The common bile duct appears normal in caliber.  No positive sonographic Hall sign reported. PANCREAS:  Visualized portions are unremarkable. RIGHT KIDNEY:  Normal in size with no focal lesions. No evidence of nephrolithiasis or hydronephrosis. The right kidney measures 12.3 cm in moor-ae-qezq length.      Impression: Impression: 1. Cholelithiasis without sonographic evidence of cholecystitis. 2. Increased echogenicity of the liver suggestive of hepatic steatosis. Electronically Signed: Ophelia Salas MD  3/3/2025 9:07 AM EST  Workstation ID: [id number]       Assessment and Plan    Diagnoses and all orders for this visit:    1. Abnormal urinalysis (Primary) - dont take macrobid at this time.  ua improved today but not totally normal.  Send culture and treat with safer antibiotic option if culture positive.  -     POCT urinalysis dipstick, automated  -     Urine Culture - Urine, Urine, Clean Catch; Future    2. Abdominal pain during pregnancy, first trimester - ok for pepcid for reflux and epigastric pain.  Pt has appt with OB on monday    3. Gallstones- reviewed u/s results.  Cont bland diet.  If sx persist refer to gen surgery                       Outpatient Medications Prior to Visit   Medication Sig Dispense Refill    ondansetron ODT (ZOFRAN-ODT) 4 MG  disintegrating tablet       prenatal vitamin (prenatal, CLASSIC, vitamin) tablet Take 1 tablet by mouth Daily.       No facility-administered medications prior to visit.     No orders of the defined types were placed in this encounter.    [unfilled]  There are no discontinued medications.      No follow-ups on file.    Patient was given instructions and counseling regarding her condition or for health maintenance advice. Please see specific information pulled into the AVS if appropriate.

## 2025-03-05 NOTE — OUTREACH NOTE
Call Center TCM Note      Flowsheet Row Responses   [facility] patient discharged from? [place]   Does the patient have one of the following disease processes/diagnoses(primary or secondary)? Other   TCM attempt successful? No   Unsuccessful attempts Attempt 1   Revoked Reason Other  [Pt completed PCP fu today. TCM complete. No tcm call required]            Adore Arellano RN    3/5/2025, 11:32 EST

## 2025-03-05 NOTE — CASE MANAGEMENT/SOCIAL WORK
Case Management Discharge Note      Final Note: dc home    Provided Post Acute Provider List?: Refused  Refused Provider List Comment: pt declined    Selected Continued Care - Discharged on 3/4/2025 Admission date: 3/2/2025 - Discharge disposition: Home or Self Care      Destination    No services have been selected for the patient.                Durable Medical Equipment    No services have been selected for the patient.                Dialysis/Infusion    No services have been selected for the patient.                Home Medical Care    No services have been selected for the patient.                Therapy    No services have been selected for the patient.                Community Resources    No services have been selected for the patient.                Community & DME    No services have been selected for the patient.                         Final Discharge Disposition Code: 01 - home or self-care

## 2025-03-07 ENCOUNTER — TELEPHONE (OUTPATIENT)
Dept: INTERNAL MEDICINE | Facility: CLINIC | Age: 30
End: 2025-03-07

## 2025-03-07 NOTE — TELEPHONE ENCOUNTER
Caller: Marina Mitchell    Relationship: Self    Best call back number:     What is the best time to reach you:     Who are you requesting to speak with (clinical staff, provider,  specific staff member):     Do you know the name of the person who called:     What was the call regarding: PATIENT SAYS SHE HAD A URINE TEST TO SEE IF SHE HAD A URINARY TRACT INFECTION AND HAS RECEIVED THE RESULTS BUT THERE WERE NO NOTES INDICATING IF SHE HAD A INFECTION OR NOT SHE WANTS TO BE CALLED TO DISCUSS.   SHE ALSO WANTS TO DISCUSS HER GALL BLADDER ISSUES AS SHE IS STILL HAVING PAIN.

## 2025-03-11 NOTE — TELEPHONE ENCOUNTER
I did not see any result notes on her most recent dip was there anything concerning pt should be aware of?

## 2025-03-17 ENCOUNTER — OFFICE VISIT (OUTPATIENT)
Dept: OBSTETRICS AND GYNECOLOGY | Facility: CLINIC | Age: 30
End: 2025-03-17
Payer: COMMERCIAL

## 2025-03-17 VITALS
WEIGHT: 264 LBS | HEIGHT: 63 IN | BODY MASS INDEX: 46.78 KG/M2 | DIASTOLIC BLOOD PRESSURE: 84 MMHG | SYSTOLIC BLOOD PRESSURE: 122 MMHG

## 2025-03-17 DIAGNOSIS — K76.0 FATTY LIVER IN MOTHER DURING PREGNANCY: ICD-10-CM

## 2025-03-17 DIAGNOSIS — O26.619 FATTY LIVER IN MOTHER DURING PREGNANCY: ICD-10-CM

## 2025-03-17 DIAGNOSIS — O26.619 CHOLELITHIASIS AFFECTING PREGNANCY, ANTEPARTUM: ICD-10-CM

## 2025-03-17 DIAGNOSIS — K80.20 CHOLELITHIASIS AFFECTING PREGNANCY, ANTEPARTUM: ICD-10-CM

## 2025-03-17 DIAGNOSIS — Z13.89 SCREENING FOR GENITOURINARY CONDITION: ICD-10-CM

## 2025-03-17 DIAGNOSIS — Z32.01 POSITIVE URINE PREGNANCY TEST: Primary | ICD-10-CM

## 2025-03-17 DIAGNOSIS — O20.8 SUBCHORIONIC HEMORRHAGE OF PLACENTA IN FIRST TRIMESTER: ICD-10-CM

## 2025-03-17 LAB
B-HCG UR QL: POSITIVE
BILIRUB BLD-MCNC: NEGATIVE MG/DL
CLARITY, POC: CLEAR
COLOR UR: YELLOW
EXPIRATION DATE: ABNORMAL
GLUCOSE UR STRIP-MCNC: NEGATIVE MG/DL
INTERNAL NEGATIVE CONTROL: ABNORMAL
INTERNAL POSITIVE CONTROL: ABNORMAL
KETONES UR QL: NEGATIVE
LEUKOCYTE EST, POC: ABNORMAL
Lab: ABNORMAL
NITRITE UR-MCNC: NEGATIVE MG/ML
PH UR: 5 [PH] (ref 5–8)
PROT UR STRIP-MCNC: ABNORMAL MG/DL
RBC # UR STRIP: NEGATIVE /UL
SP GR UR: 1.01 (ref 1–1.03)
UROBILINOGEN UR QL: ABNORMAL

## 2025-03-17 RX ORDER — ONDANSETRON 4 MG/1
4 TABLET, ORALLY DISINTEGRATING ORAL EVERY 8 HOURS PRN
Qty: 30 TABLET | Refills: 1 | Status: SHIPPED | OUTPATIENT
Start: 2025-03-17

## 2025-03-17 RX ORDER — FAMOTIDINE 20 MG/1
20 TABLET, FILM COATED ORAL 2 TIMES DAILY
Qty: 60 TABLET | Refills: 1 | Status: SHIPPED | OUTPATIENT
Start: 2025-03-17 | End: 2026-03-17

## 2025-03-17 RX ORDER — DOXYLAMINE SUCCINATE 25 MG/1
25 TABLET ORAL NIGHTLY
Qty: 30 TABLET | Refills: 1 | Status: SHIPPED | OUTPATIENT
Start: 2025-03-17

## 2025-03-17 RX ORDER — DIPHENHYDRAMINE HYDROCHLORIDE 25 MG/1
25 CAPSULE ORAL NIGHTLY
Qty: 30 TABLET | Refills: 1 | Status: SHIPPED | OUTPATIENT
Start: 2025-03-17

## 2025-03-17 RX ORDER — NITROFURANTOIN 25; 75 MG/1; MG/1
CAPSULE ORAL
COMMUNITY
Start: 2025-03-02

## 2025-03-17 NOTE — TELEPHONE ENCOUNTER
Check on pt.  Ua had trace blood and LE.  If having dysuria we should recheck urine and send culture

## 2025-03-17 NOTE — PROGRESS NOTES
Confirmation of pregnancy     Chief Complaint   Patient presents with    Possible Pregnancy         Marina Mitchell is being seen today for evaluation of absence of menses. Due to her period being late, she tested for pregnancy and had + home UPT. She is a 29 y.o. . This problem is new to me, the examiner.       OB History          2    Para        Term                AB   1    Living             SAB        IAB        Ectopic        Molar        Multiple        Live Births                    LNMP: 25  Confident with date: Yes  Taking prenatal vitamins: Yes. Needs RX: No  Planned pregnancy: Yes  Prior obstetric issues, potential pregnancy concerns: H/O SAB, gallstones, and fatty liver  Family history of genetic issues (includes FOB): denies   Varicella Hx: vaccine  Flu vaccine: did not have   COVID 19 vaccine: S/P Vaccine. Booster vaccine: did not do   History of STDs: denies   Current medications: PNV   Last pap smear: Uncertain   Smoker: No  Drug or alcohol abuse: No  H/O physical, emotional or sexual abuse:denies  H/O mental health disorder: denies   Prior testing for Cystic Fibrosis Carrier or Sickle Cell Trait- has not had   Prepregnancy BMI - Body mass index is 46.78 kg/m².    Past Medical History:   Diagnosis Date    Fatty liver     Gallstones        Past Surgical History:   Procedure Laterality Date    CLAVICLE SURGERY Right 10/31/2019         Current Outpatient Medications:     nitrofurantoin, macrocrystal-monohydrate, (MACROBID) 100 MG capsule, , Disp: , Rfl:     ondansetron ODT (ZOFRAN-ODT) 4 MG disintegrating tablet, , Disp: , Rfl:     prenatal vitamin (prenatal, CLASSIC, vitamin) tablet, Take 1 tablet by mouth Daily., Disp: , Rfl:     doxylamine (Unisom SleepTabs) 25 MG tablet, Take 1 tablet by mouth Every Night., Disp: 30 tablet, Rfl: 1    famotidine (Pepcid) 20 MG tablet, Take 1 tablet by mouth 2 (Two) Times a Day., Disp: 60 tablet, Rfl: 1    ondansetron ODT (ZOFRAN-ODT) 4 MG  "disintegrating tablet, Take 1 tablet by mouth Every 8 (Eight) Hours As Needed for Vomiting or Nausea., Disp: 30 tablet, Rfl: 1    vitamin B-6 (PYRIDOXINE) 25 MG tablet, Take 1 tablet by mouth Every Night., Disp: 30 tablet, Rfl: 1    No Known Allergies    Social History     Socioeconomic History    Marital status: Single   Tobacco Use    Smoking status: Never     Passive exposure: Never    Smokeless tobacco: Never   Vaping Use    Vaping status: Never Used   Substance and Sexual Activity    Alcohol use: Not Currently     Comment: Maybe when or to dinner or special occasion    Drug use: Never    Sexual activity: Yes     Partners: Male     Birth control/protection: None       Family History   Problem Relation Age of Onset    Lung cancer Mother     Lupus Mother     Drug abuse Mother     Prostate cancer Father     Seizures Father     Cervical cancer Maternal Grandmother         Cervical    Heart failure Paternal Grandmother     No Known Problems Half-Brother     No Known Problems Half-Sister        Review of systems     Review of Systems   Constitutional: Negative.    Respiratory: Negative.     Cardiovascular: Negative.    Gastrointestinal:  Positive for nausea. Negative for vomiting.   Genitourinary:  Positive for menstrual problem.   Musculoskeletal: Negative.    Skin: Negative.    Neurological: Negative.    Psychiatric/Behavioral: Negative.         Objective    /84   Ht 160 cm (62.99\")   Wt 120 kg (264 lb)   LMP 09/22/2024 (Approximate)   Breastfeeding No   BMI 46.78 kg/m²     Physical Exam  Vitals and nursing note reviewed.   Constitutional:       Appearance: Normal appearance.   Musculoskeletal:         General: Normal range of motion.   Skin:     General: Skin is warm and dry.   Neurological:      General: No focal deficit present.      Mental Status: She is alert and oriented to person, place, and time.   Psychiatric:         Mood and Affect: Mood normal.         Behavior: Behavior normal. "         Assessment/Plan      Missed menses: + UPT in office. LNMP was 1/27/25= 7.0 weeks. EDC 11/3/25. Oriented to practice. US today shows a single, viable IUP @ 6.5 weeks.  bpm. ARLEEN noted measuring 0.96 x 0.31cm. Both ovaries contain small cysts. EDC established 11/3/25.     Pregnancy: Disc importance of regular prenatal care. Enc PNV daily. Counseled on providers and on call phone. Disc Tylenol products are ok and encouraged no ibuprofen or ASA in pregnancy.  Disc exercise in pregnancy, diet, expected weight gain, etc. Enc no use of tobacco, vaping, drugs, or alcohol during pregnancy. Rev warn s/s of SAB.     Labs: Pt counseled on genetic screening, Quad screen, AFP, and NIPS.      Body mass index is 46.78 kg/m². Obese women with a pre-pregnancy BMI of 30+ should strive to gain approx 11-20 pounds for the entire pregnancy. Rec baby ASA @ 12 weeks. Growth US every 4 weeks. ANT @ 34 weeks.     Smoker- non smoker    6.   COVID19- S/P vaccine x 2. No booster    7.  Flu vaccine declined     8.  Cholelithiasis without evidence of cholecystitis- S/P hospital admission for (R)UQ pain.  She has followed up with PCP. No ref to general surgery. Ref placed.     9.  ? PCOS diagnosis- Had polycystic appearing ovaries on US. Normal labs and h/o normal cycles. She did have a few abnormal cycles following her miscarriage 5/24.      10. Fatty liver- This is not a new diagnosis. She has never seen GI. Ref placed.     11. H/O SAB- 5/24     12.  Nausea- Enc small frequent meals.     13.  ARLEEN- No VB. Rh +. Enc pelvic rest.     All questions answered.         Encounter Diagnoses   Name Primary?    Positive urine pregnancy test Yes    Screening for genitourinary condition     Fatty liver in mother during pregnancy     Cholelithiasis affecting pregnancy, antepartum        Diagnoses and all orders for this visit:    Positive urine pregnancy test    Screening for genitourinary condition  -     POC Urinalysis Dipstick  -     POC  Pregnancy, Urine    Fatty liver in mother during pregnancy  -     Ambulatory Referral to Gastroenterology    Cholelithiasis affecting pregnancy, antepartum  -     Ambulatory Referral to General Surgery    Other orders  -     nitrofurantoin, macrocrystal-monohydrate, (MACROBID) 100 MG capsule  -     vitamin B-6 (PYRIDOXINE) 25 MG tablet; Take 1 tablet by mouth Every Night.  -     doxylamine (Unisom SleepTabs) 25 MG tablet; Take 1 tablet by mouth Every Night.  -     ondansetron ODT (ZOFRAN-ODT) 4 MG disintegrating tablet; Take 1 tablet by mouth Every 8 (Eight) Hours As Needed for Vomiting or Nausea.  -     famotidine (Pepcid) 20 MG tablet; Take 1 tablet by mouth 2 (Two) Times a Day.        RTO in 3 weeks for new OB exam, labs and ultrasound- GILBERTO Carreno  3/20/2025  12:58 EDT

## 2025-03-25 ENCOUNTER — HOSPITAL ENCOUNTER (EMERGENCY)
Facility: HOSPITAL | Age: 30
Discharge: HOME | End: 2025-03-26
Attending: EMERGENCY MEDICINE | Admitting: EMERGENCY MEDICINE
Payer: COMMERCIAL

## 2025-03-25 VITALS
TEMPERATURE: 97.8 F | WEIGHT: 265 LBS | DIASTOLIC BLOOD PRESSURE: 89 MMHG | BODY MASS INDEX: 46.95 KG/M2 | HEART RATE: 86 BPM | RESPIRATION RATE: 18 BRPM | OXYGEN SATURATION: 98 % | SYSTOLIC BLOOD PRESSURE: 125 MMHG | HEIGHT: 63 IN

## 2025-03-25 DIAGNOSIS — K29.00 ACUTE SUPERFICIAL GASTRITIS WITHOUT HEMORRHAGE: Primary | ICD-10-CM

## 2025-03-25 LAB
ALBUMIN SERPL-MCNC: 4.4 G/DL (ref 3.5–5.2)
ALBUMIN/GLOB SERPL: 1.3 G/DL
ALP SERPL-CCNC: 55 U/L (ref 39–117)
ALT SERPL W P-5'-P-CCNC: 37 U/L (ref 1–33)
ANION GAP SERPL CALCULATED.3IONS-SCNC: 10.5 MMOL/L (ref 5–15)
AST SERPL-CCNC: 20 U/L (ref 1–32)
BACTERIA UR QL AUTO: ABNORMAL /HPF
BASOPHILS # BLD AUTO: 0.09 10*3/MM3 (ref 0–0.2)
BASOPHILS NFR BLD AUTO: 0.6 % (ref 0–1.5)
BILIRUB SERPL-MCNC: 0.3 MG/DL (ref 0–1.2)
BILIRUB UR QL STRIP: NEGATIVE
BUN SERPL-MCNC: 13 MG/DL (ref 6–20)
BUN/CREAT SERPL: 21.3 (ref 7–25)
CALCIUM SPEC-SCNC: 9.5 MG/DL (ref 8.6–10.5)
CHLORIDE SERPL-SCNC: 100 MMOL/L (ref 98–107)
CLARITY UR: CLEAR
CO2 SERPL-SCNC: 24.5 MMOL/L (ref 22–29)
COLOR UR: YELLOW
CREAT SERPL-MCNC: 0.61 MG/DL (ref 0.57–1)
DEPRECATED RDW RBC AUTO: 43 FL (ref 37–54)
EGFRCR SERPLBLD CKD-EPI 2021: 125.1 ML/MIN/1.73
EOSINOPHIL # BLD AUTO: 0.28 10*3/MM3 (ref 0–0.4)
EOSINOPHIL NFR BLD AUTO: 1.9 % (ref 0.3–6.2)
ERYTHROCYTE [DISTWIDTH] IN BLOOD BY AUTOMATED COUNT: 13 % (ref 12.3–15.4)
GLOBULIN UR ELPH-MCNC: 3.4 GM/DL
GLUCOSE SERPL-MCNC: 105 MG/DL (ref 65–99)
GLUCOSE UR STRIP-MCNC: NEGATIVE MG/DL
HCG INTACT+B SERPL-ACNC: NORMAL MIU/ML
HCT VFR BLD AUTO: 41.8 % (ref 34–46.6)
HGB BLD-MCNC: 13.9 G/DL (ref 12–15.9)
HGB UR QL STRIP.AUTO: ABNORMAL
HYALINE CASTS UR QL AUTO: ABNORMAL /LPF
IMM GRANULOCYTES # BLD AUTO: 0.08 10*3/MM3 (ref 0–0.05)
IMM GRANULOCYTES NFR BLD AUTO: 0.6 % (ref 0–0.5)
KETONES UR QL STRIP: NEGATIVE
LEUKOCYTE ESTERASE UR QL STRIP.AUTO: NEGATIVE
LIPASE SERPL-CCNC: 48 U/L (ref 13–60)
LYMPHOCYTES # BLD AUTO: 4.29 10*3/MM3 (ref 0.7–3.1)
LYMPHOCYTES NFR BLD AUTO: 29.6 % (ref 19.6–45.3)
MCH RBC QN AUTO: 30 PG (ref 26.6–33)
MCHC RBC AUTO-ENTMCNC: 33.3 G/DL (ref 31.5–35.7)
MCV RBC AUTO: 90.3 FL (ref 79–97)
MONOCYTES # BLD AUTO: 0.73 10*3/MM3 (ref 0.1–0.9)
MONOCYTES NFR BLD AUTO: 5 % (ref 5–12)
NEUTROPHILS NFR BLD AUTO: 62.3 % (ref 42.7–76)
NEUTROPHILS NFR BLD AUTO: 9.03 10*3/MM3 (ref 1.7–7)
NITRITE UR QL STRIP: NEGATIVE
NRBC BLD AUTO-RTO: 0 /100 WBC (ref 0–0.2)
PH UR STRIP.AUTO: 6 [PH] (ref 4.5–8)
PLATELET # BLD AUTO: 322 10*3/MM3 (ref 140–450)
PMV BLD AUTO: 9.7 FL (ref 6–12)
POTASSIUM SERPL-SCNC: 4.3 MMOL/L (ref 3.5–5.2)
PROT SERPL-MCNC: 7.8 G/DL (ref 6–8.5)
PROT UR QL STRIP: NEGATIVE
RBC # BLD AUTO: 4.63 10*6/MM3 (ref 3.77–5.28)
RBC # UR STRIP: ABNORMAL /HPF
RBC CASTS #/AREA URNS LPF: ABNORMAL /LPF
REF LAB TEST METHOD: ABNORMAL
SODIUM SERPL-SCNC: 135 MMOL/L (ref 136–145)
SP GR UR STRIP: 1.01 (ref 1–1.03)
SQUAMOUS #/AREA URNS HPF: ABNORMAL /HPF
UROBILINOGEN UR QL STRIP: ABNORMAL
WBC # UR STRIP: ABNORMAL /HPF
WBC NRBC COR # BLD AUTO: 14.5 10*3/MM3 (ref 3.4–10.8)

## 2025-03-25 PROCEDURE — 25010000002 ONDANSETRON PER 1 MG: Performed by: EMERGENCY MEDICINE

## 2025-03-25 PROCEDURE — 81001 URINALYSIS AUTO W/SCOPE: CPT | Performed by: EMERGENCY MEDICINE

## 2025-03-25 PROCEDURE — 83690 ASSAY OF LIPASE: CPT | Performed by: EMERGENCY MEDICINE

## 2025-03-25 PROCEDURE — 96375 TX/PRO/DX INJ NEW DRUG ADDON: CPT

## 2025-03-25 PROCEDURE — 96374 THER/PROPH/DIAG INJ IV PUSH: CPT

## 2025-03-25 PROCEDURE — 96361 HYDRATE IV INFUSION ADD-ON: CPT

## 2025-03-25 PROCEDURE — 25810000003 SODIUM CHLORIDE 0.9 % SOLUTION: Performed by: EMERGENCY MEDICINE

## 2025-03-25 PROCEDURE — 84702 CHORIONIC GONADOTROPIN TEST: CPT | Performed by: EMERGENCY MEDICINE

## 2025-03-25 PROCEDURE — 80053 COMPREHEN METABOLIC PANEL: CPT | Performed by: EMERGENCY MEDICINE

## 2025-03-25 PROCEDURE — 85025 COMPLETE CBC W/AUTO DIFF WBC: CPT | Performed by: EMERGENCY MEDICINE

## 2025-03-25 PROCEDURE — 99283 EMERGENCY DEPT VISIT LOW MDM: CPT | Performed by: EMERGENCY MEDICINE

## 2025-03-25 RX ORDER — ONDANSETRON 2 MG/ML
8 INJECTION INTRAMUSCULAR; INTRAVENOUS ONCE
Status: COMPLETED | OUTPATIENT
Start: 2025-03-25 | End: 2025-03-25

## 2025-03-25 RX ORDER — FAMOTIDINE 10 MG/ML
20 INJECTION, SOLUTION INTRAVENOUS ONCE
Status: COMPLETED | OUTPATIENT
Start: 2025-03-25 | End: 2025-03-25

## 2025-03-25 RX ADMIN — SODIUM CHLORIDE 1000 ML: 9 INJECTION, SOLUTION INTRAVENOUS at 23:26

## 2025-03-25 RX ADMIN — ONDANSETRON 8 MG: 2 INJECTION INTRAMUSCULAR; INTRAVENOUS at 23:26

## 2025-03-25 RX ADMIN — FAMOTIDINE 20 MG: 10 INJECTION INTRAVENOUS at 23:27

## 2025-03-26 RX ORDER — SUCRALFATE ORAL 1 G/10ML
1 SUSPENSION ORAL 4 TIMES DAILY
Qty: 420 ML | Refills: 0 | Status: SHIPPED | OUTPATIENT
Start: 2025-03-26

## 2025-03-26 RX ORDER — SUCRALFATE 1 G/1
1 TABLET ORAL ONCE
Status: COMPLETED | OUTPATIENT
Start: 2025-03-26 | End: 2025-03-26

## 2025-03-26 RX ADMIN — SUCRALFATE 1 G: 1 TABLET ORAL at 00:33

## 2025-03-26 NOTE — ED PROVIDER NOTES
Subjective   History of Present Illness    Chief complaint: Abdominal    Location: Epigastric and right upper quadrant    Quality/Severity: Moderate    Timing/Onset/Duration: 9:00 tonight    Modifying Factors: No modifying factors    Associated Symptoms: No headache.  No fever chills or cough.  No sore throat.  For nasal congestion.  No chest pain or shortness of breath.  No diarrhea or burning when she urinates.  Patient's had nausea.  Patient's had vomiting x 2, nonbloody, nonbilious.    Narrative: This 29-year-old presents with right upper quadrant abdominal pain that started at 2100 tonight.  Patient has a history of gallbladder issues and was admitted in February for it.  Patient is 8 weeks pregnant was told they wanted to wait till she was at least in her second trimester before they would remove her gallbladder.  Patient vomited prior to arrival in the emergency department.    PCP:Precious Suárez MD      Review of Systems   Constitutional:  Negative for chills and fever.   HENT:  Negative for ear pain and sore throat.    Respiratory:  Negative for cough and shortness of breath.    Gastrointestinal:  Positive for abdominal pain, nausea and vomiting. Negative for diarrhea.   Genitourinary:  Negative for difficulty urinating.   Skin:  Negative for rash.   Neurological:  Negative for headaches.       Past Medical History:   Diagnosis Date    Fatty liver     Gallstones        No Known Allergies    Past Surgical History:   Procedure Laterality Date    CLAVICLE SURGERY Right 10/31/2019       Family History   Problem Relation Age of Onset    Lung cancer Mother     Lupus Mother     Drug abuse Mother     Prostate cancer Father     Seizures Father     Cervical cancer Maternal Grandmother         Cervical    Heart failure Paternal Grandmother     No Known Problems Half-Brother     No Known Problems Half-Sister        Social History     Socioeconomic History    Marital status: Single   Tobacco Use    Smoking  status: Never     Passive exposure: Never    Smokeless tobacco: Never   Vaping Use    Vaping status: Never Used   Substance and Sexual Activity    Alcohol use: Not Currently     Comment: Maybe when or to dinner or special occasion    Drug use: Never    Sexual activity: Yes     Partners: Male     Birth control/protection: None           Objective   Physical Exam  Vitals (He temperature is 97.8 °F, pulse 86, respirations 18, /89, room air pulse ox 98%.) and nursing note reviewed.   Constitutional:       Comments: Patient appears to be uncomfortable.   HENT:      Head: Normocephalic and atraumatic.   Cardiovascular:      Rate and Rhythm: Normal rate and regular rhythm.      Heart sounds: Normal heart sounds. No murmur heard.     No friction rub. No gallop.   Pulmonary:      Effort: Pulmonary effort is normal.      Breath sounds: Normal breath sounds.   Abdominal:      General: Abdomen is flat. Bowel sounds are normal.      Palpations: Abdomen is soft. There is no mass.      Tenderness: There is abdominal tenderness in the right upper quadrant and epigastric area. There is no guarding or rebound.      Hernia: No hernia is present.   Skin:     General: Skin is warm and dry.      Findings: No rash.   Neurological:      General: No focal deficit present.      Mental Status: She is alert and oriented to person, place, and time.         Procedures           ED Course  ED Course as of 03/26/25 0027   Tue Mar 25, 2025   2327 The white blood cell count is 14.5, absolute neutrophil count 9.03.  The CBC is otherwise unremarkable [RC]   2350 The lipase is 48 and normal. [RC]   2350 Urine microscopic shows 6-10 RBCs, 0-2 WBCs, no bacteria, 3 to 6 g epithelial cells.  The [RC]   2350  leukocyte is negative, nitrite negative.  The urinalysis is otherwise unremarkable. [RC]   2350 The glucose is 105, sodium 135, ALT 37, GFR 1.5, CMP is otherwise unremarkable. [RC]      ED Course User Index  [RC] Aba Borrero MD    00:27  EDT, 03/26/25:  The patient was reassessed.  She feels much better.  Her vital signs were reviewed and are stable.  Abdominal exam: Soft, nontender, no masses, positive bowel sounds.    00:27 EDT, 03/26/25:  The patient's diagnosis of gastritis was discussed with her.  The patient has Pepcid that she is taking.  I will place her on Carafate.  The patient should call Dr. Gonzales in the morning for a follow-up appointment this week.  The patient to return to the emergency department there is worsening pain, vomiting, black or bloody stools, black or bloody emesis, fever, worse in any way at all.  All the patient's question were answered she will be discharged in good condition.                                                   Medical Decision Making      Final diagnoses:   Acute superficial gastritis without hemorrhage       ED Disposition  ED Disposition       None            No follow-up provider specified.       Medication List      No changes were made to your prescriptions during this visit.       No orders to display     Labs Reviewed - No data to display  US Ob Transvaginal  Result Date: 3/4/2025  Narrative: Small gestational sac measuring 4.9 x 4.4 x 6.2 mm dating 5 weeks 2 days.  No yolk sac or fetal pole identified.  Ovaries appear normal bilaterally. Ino Francois MD 3/4/2025 15:09 EST    US Gallbladder  Result Date: 3/3/2025  Narrative: US GALLBLADDER Date of Exam: 3/3/2025 8:19 AM EST Indication: RUQ abdominal pain; mild transaminitis. Comparison: No comparisons available. Technique: Grayscale and color Doppler ultrasound evaluation of the right upper quadrant was performed. Findings: LIVER:  The liver appears increased in echogenicity.No focal lesions identified. Normal flow is present within the hepatic vasculature. GALLBLADDER:  The gallbladder contains multiple stones without wall thickening or pericholecystic fluid. The common bile duct appears normal in caliber.  No positive sonographic Hall sign  reported. PANCREAS:  Visualized portions are unremarkable. RIGHT KIDNEY:  Normal in size with no focal lesions. No evidence of nephrolithiasis or hydronephrosis. The right kidney measures 12.3 cm in [id number] length.     Impression: Impression: 1. Cholelithiasis without sonographic evidence of cholecystitis. 2. Increased echogenicity of the liver suggestive of hepatic steatosis. Electronically Signed: Ophelia Salas MD  3/3/2025 9:07 AM EST  Workstation ID: [id number]      Final diagnoses:   None         ED Medications:  Medications - No data to display    New Medications:     Medication List        ASK your doctor about these medications      famotidine 20 MG tablet  Commonly known as: Pepcid  Take 1 tablet by mouth 2 (Two) Times a Day.     nitrofurantoin (macrocrystal-monohydrate) 100 MG capsule  Commonly known as: MACROBID     * ondansetron ODT 4 MG disintegrating tablet  Commonly known as: ZOFRAN-ODT     * ondansetron ODT 4 MG disintegrating tablet  Commonly known as: ZOFRAN-ODT  Take 1 tablet by mouth Every 8 (Eight) Hours As Needed for Vomiting or Nausea.     prenatal (CLASSIC) vitamin 28-0.8 MG tablet tablet  Generic drug: prenatal vitamin     Unisom SleepTabs 25 MG tablet  Generic drug: doxylamine  Take 1 tablet by mouth Every Night.     vitamin B-6 25 MG tablet  Commonly known as: PYRIDOXINE  Take 1 tablet by mouth Every Night.           * This list has 2 medication(s) that are the same as other medications prescribed for you. Read the directions carefully, and ask your doctor or other care provider to review them with you.                  Stopped Medications:     Medication List        ASK your doctor about these medications      famotidine 20 MG tablet  Commonly known as: Pepcid  Take 1 tablet by mouth 2 (Two) Times a Day.     nitrofurantoin (macrocrystal-monohydrate) 100 MG capsule  Commonly known as: MACROBID     * ondansetron ODT 4 MG disintegrating tablet  Commonly known as: ZOFRAN-ODT     *  ondansetron ODT 4 MG disintegrating tablet  Commonly known as: ZOFRAN-ODT  Take 1 tablet by mouth Every 8 (Eight) Hours As Needed for Vomiting or Nausea.     prenatal (CLASSIC) vitamin 28-0.8 MG tablet tablet  Generic drug: prenatal vitamin     Unisom SleepTabs 25 MG tablet  Generic drug: doxylamine  Take 1 tablet by mouth Every Night.     vitamin B-6 25 MG tablet  Commonly known as: PYRIDOXINE  Take 1 tablet by mouth Every Night.           * This list has 2 medication(s) that are the same as other medications prescribed for you. Read the directions carefully, and ask your doctor or other care provider to review them with you.                       Aba Borrero MD  03/26/25 0155

## 2025-03-26 NOTE — DISCHARGE INSTRUCTIONS
Clear liquids for 24 to 48 hours, then advance diet as tolerated.  Avoid spicy and fatty foods take the Pepcid as prescribed take the Carafate as prescribed.  Call Dr. Gonzales this morning for follow-up appointment this week.  Return to the emergency department if there is worsening pain, vomiting not controlled with Zofran, vomiting blood, bloody stools, worse in any way at all.

## 2025-03-29 ENCOUNTER — APPOINTMENT (OUTPATIENT)
Dept: ULTRASOUND IMAGING | Facility: HOSPITAL | Age: 30
End: 2025-03-29
Payer: COMMERCIAL

## 2025-03-29 ENCOUNTER — HOSPITAL ENCOUNTER (EMERGENCY)
Facility: HOSPITAL | Age: 30
Discharge: HOME | End: 2025-03-29
Attending: EMERGENCY MEDICINE
Payer: COMMERCIAL

## 2025-03-29 VITALS
DIASTOLIC BLOOD PRESSURE: 75 MMHG | HEART RATE: 88 BPM | OXYGEN SATURATION: 97 % | WEIGHT: 265 LBS | RESPIRATION RATE: 18 BRPM | SYSTOLIC BLOOD PRESSURE: 123 MMHG | HEIGHT: 63 IN | TEMPERATURE: 98.6 F | BODY MASS INDEX: 46.95 KG/M2

## 2025-03-29 DIAGNOSIS — K80.50 BILIARY COLIC: ICD-10-CM

## 2025-03-29 DIAGNOSIS — R79.89 ELEVATED LFTS: ICD-10-CM

## 2025-03-29 DIAGNOSIS — Z34.91 FIRST TRIMESTER PREGNANCY: Primary | ICD-10-CM

## 2025-03-29 DIAGNOSIS — K80.20 CALCULUS OF GALLBLADDER WITHOUT CHOLECYSTITIS WITHOUT OBSTRUCTION: ICD-10-CM

## 2025-03-29 LAB
ALBUMIN SERPL-MCNC: 3.7 G/DL (ref 3.5–5.2)
ALBUMIN/GLOB SERPL: 0.9 G/DL
ALP SERPL-CCNC: 98 U/L (ref 39–117)
ALT SERPL W P-5'-P-CCNC: 272 U/L (ref 1–33)
ANION GAP SERPL CALCULATED.3IONS-SCNC: 13.2 MMOL/L (ref 5–15)
AST SERPL-CCNC: 333 U/L (ref 1–32)
BACTERIA UR QL AUTO: ABNORMAL /HPF
BASOPHILS # BLD AUTO: 0.05 10*3/MM3 (ref 0–0.2)
BASOPHILS NFR BLD AUTO: 0.4 % (ref 0–1.5)
BILIRUB SERPL-MCNC: 1.2 MG/DL (ref 0–1.2)
BILIRUB UR QL STRIP: NEGATIVE
BUN SERPL-MCNC: 7 MG/DL (ref 6–20)
BUN/CREAT SERPL: 10.9 (ref 7–25)
CALCIUM SPEC-SCNC: 9.7 MG/DL (ref 8.6–10.5)
CHLORIDE SERPL-SCNC: 102 MMOL/L (ref 98–107)
CLARITY UR: CLEAR
CO2 SERPL-SCNC: 21.8 MMOL/L (ref 22–29)
COLOR UR: YELLOW
CREAT SERPL-MCNC: 0.64 MG/DL (ref 0.57–1)
DEPRECATED RDW RBC AUTO: 39.8 FL (ref 37–54)
EGFRCR SERPLBLD CKD-EPI 2021: 123.6 ML/MIN/1.73
EOSINOPHIL # BLD AUTO: 0.1 10*3/MM3 (ref 0–0.4)
EOSINOPHIL NFR BLD AUTO: 0.9 % (ref 0.3–6.2)
ERYTHROCYTE [DISTWIDTH] IN BLOOD BY AUTOMATED COUNT: 12.3 % (ref 12.3–15.4)
GLOBULIN UR ELPH-MCNC: 4.2 GM/DL
GLUCOSE SERPL-MCNC: 98 MG/DL (ref 65–99)
GLUCOSE UR STRIP-MCNC: NEGATIVE MG/DL
HCG INTACT+B SERPL-ACNC: NORMAL MIU/ML
HCT VFR BLD AUTO: 41.5 % (ref 34–46.6)
HGB BLD-MCNC: 13.9 G/DL (ref 12–15.9)
HGB UR QL STRIP.AUTO: NEGATIVE
HYALINE CASTS UR QL AUTO: ABNORMAL /LPF
IMM GRANULOCYTES # BLD AUTO: 0.07 10*3/MM3 (ref 0–0.05)
IMM GRANULOCYTES NFR BLD AUTO: 0.6 % (ref 0–0.5)
KETONES UR QL STRIP: NEGATIVE
LEUKOCYTE ESTERASE UR QL STRIP.AUTO: ABNORMAL
LIPASE SERPL-CCNC: 41 U/L (ref 13–60)
LYMPHOCYTES # BLD AUTO: 1.81 10*3/MM3 (ref 0.7–3.1)
LYMPHOCYTES NFR BLD AUTO: 16.1 % (ref 19.6–45.3)
MCH RBC QN AUTO: 30 PG (ref 26.6–33)
MCHC RBC AUTO-ENTMCNC: 33.5 G/DL (ref 31.5–35.7)
MCV RBC AUTO: 89.4 FL (ref 79–97)
MONOCYTES # BLD AUTO: 0.61 10*3/MM3 (ref 0.1–0.9)
MONOCYTES NFR BLD AUTO: 5.4 % (ref 5–12)
NEUTROPHILS NFR BLD AUTO: 76.6 % (ref 42.7–76)
NEUTROPHILS NFR BLD AUTO: 8.61 10*3/MM3 (ref 1.7–7)
NITRITE UR QL STRIP: NEGATIVE
NRBC BLD AUTO-RTO: 0 /100 WBC (ref 0–0.2)
PH UR STRIP.AUTO: 6 [PH] (ref 5–8)
PLATELET # BLD AUTO: 324 10*3/MM3 (ref 140–450)
PMV BLD AUTO: 9.5 FL (ref 6–12)
POTASSIUM SERPL-SCNC: 4.1 MMOL/L (ref 3.5–5.2)
PROT SERPL-MCNC: 7.9 G/DL (ref 6–8.5)
PROT UR QL STRIP: NEGATIVE
RBC # BLD AUTO: 4.64 10*6/MM3 (ref 3.77–5.28)
RBC # UR STRIP: ABNORMAL /HPF
REF LAB TEST METHOD: ABNORMAL
SODIUM SERPL-SCNC: 137 MMOL/L (ref 136–145)
SP GR UR STRIP: 1.01 (ref 1–1.03)
SQUAMOUS #/AREA URNS HPF: ABNORMAL /HPF
UROBILINOGEN UR QL STRIP: ABNORMAL
WBC # UR STRIP: ABNORMAL /HPF
WBC NRBC COR # BLD AUTO: 11.25 10*3/MM3 (ref 3.4–10.8)

## 2025-03-29 PROCEDURE — 80053 COMPREHEN METABOLIC PANEL: CPT | Performed by: PHYSICIAN ASSISTANT

## 2025-03-29 PROCEDURE — 84702 CHORIONIC GONADOTROPIN TEST: CPT | Performed by: PHYSICIAN ASSISTANT

## 2025-03-29 PROCEDURE — 87086 URINE CULTURE/COLONY COUNT: CPT | Performed by: PHYSICIAN ASSISTANT

## 2025-03-29 PROCEDURE — 83690 ASSAY OF LIPASE: CPT | Performed by: PHYSICIAN ASSISTANT

## 2025-03-29 PROCEDURE — 99221 1ST HOSP IP/OBS SF/LOW 40: CPT | Performed by: SURGERY

## 2025-03-29 PROCEDURE — 85025 COMPLETE CBC W/AUTO DIFF WBC: CPT | Performed by: PHYSICIAN ASSISTANT

## 2025-03-29 PROCEDURE — 63710000001 ONDANSETRON ODT 4 MG TABLET DISPERSIBLE: Performed by: PHYSICIAN ASSISTANT

## 2025-03-29 PROCEDURE — 36415 COLL VENOUS BLD VENIPUNCTURE: CPT

## 2025-03-29 PROCEDURE — 76705 ECHO EXAM OF ABDOMEN: CPT

## 2025-03-29 PROCEDURE — 76817 TRANSVAGINAL US OBSTETRIC: CPT

## 2025-03-29 PROCEDURE — 99284 EMERGENCY DEPT VISIT MOD MDM: CPT

## 2025-03-29 PROCEDURE — 81001 URINALYSIS AUTO W/SCOPE: CPT | Performed by: PHYSICIAN ASSISTANT

## 2025-03-29 PROCEDURE — 76815 OB US LIMITED FETUS(S): CPT

## 2025-03-29 RX ORDER — ONDANSETRON 4 MG/1
4 TABLET, ORALLY DISINTEGRATING ORAL ONCE
Status: COMPLETED | OUTPATIENT
Start: 2025-03-29 | End: 2025-03-29

## 2025-03-29 RX ORDER — ONDANSETRON 4 MG/1
4 TABLET, ORALLY DISINTEGRATING ORAL 4 TIMES DAILY PRN
Qty: 21 TABLET | Refills: 0 | Status: SHIPPED | OUTPATIENT
Start: 2025-03-29 | End: 2025-04-02 | Stop reason: SDUPTHER

## 2025-03-29 RX ADMIN — ONDANSETRON 4 MG: 4 TABLET, ORALLY DISINTEGRATING ORAL at 12:11

## 2025-03-29 NOTE — ED PROVIDER NOTES
Pt presents to the ED c/o right upper quadrant abdominal pain that started last night, patient is approximately 8 weeks pregnant.  Has known cholelithiasis.  Trying to get to 20 weeks before they can potentially take out her gallbladder.  Currently feeling much better without active bleeding pain or nausea right now.     On exam,   General: No acute distress, nontoxic  HEENT: EOMI  Pulm: Symmetric chest rise, nonlabored breathing  CV: Regular rate and rhythm  GI: Nondistended nontender  MSK: No deformity  Skin: Warm, dry  Neuro: Awake, alert, oriented x 4, moving all extremities, no focal deficits  Psych: Calm, cooperative    Vital signs and nursing notes reviewed.           Plan: Plan for labs and ultrasound and reevaluate with consideration for cholelithiasis, cholecystitis, biliary obstruction, among others.    ED Course as of 03/29/25 1422   Sat Mar 29, 2025   0854 Patient had appointment with Dr. Luong on April 11, 2025 for recurrent biliary colic.  Patient's symptoms have resolved now and ultrasound shows no gallbladder wall thickening or stranding.  His Eminase levels are elevated above where they have been.  And given patient's recurrent symptoms are going please call to general surgery. [RC]   1026 IMPRESSION:  1. Luciano viable intrauterine pregnancy at EGA 8 weeks 1 day with JOSE LUIS  November 7, 2025 based on crown-rump length, which is concordant with  expected dating based on given LMP.  2. No acute findings identified.        This report was finalized on 3/29/2025 8:25 AM by Dr. Matt Tobias M.D on Workstation: [id number]   [RC]   1100 Discussed patient's case with general surgery who will come and discuss the patient's case with the patient at bedside. [RC]   1300 Surgical option discussed with the patient by Dr. Bernal.  Patient declined surgery for now states she would like to continue with conservative measures for now and to follow-up with Dr. Luong.  Gallbladder eating plan discussed  with the patient by myself and Dr. Bernal.  Patient knows to follow-up with OB/GYN or primary care in a week's time to have her LFTs rechecked.  She knows to return to the ER with any worsening symptoms or should she have any further concerns.  She will keep her appointment with Dr. Luong on 11 March 2025. [RC]      ED Course User Index  [RC] Artemio Gurrola III, PA       Ultrasound without signs of biliary obstruction currently, does have some mild LFT elevations, she reports that she has had some fluctuations in those levels.  Will touch base with surgery by think at this point she will be able to discharge and maintain outpatient follow-up in April with general surgery.    Surgery has discussed with her, at this point she will defer surgery and plan for discharge home with outpatient follow-up.  ED return for worsening symptoms as needed.     MD Attestation Note    SHARED VISIT: This visit was performed by BOTH a physician and an APC. The substantive portion of the medical decision making was performed by this attesting physician who made or approved the management plan and takes responsibility for patient management. All studies in the APC note (if performed) were independently interpreted by me.                   Nate López MD  03/29/25 1555

## 2025-03-29 NOTE — ED PROVIDER NOTES
EMERGENCY DEPARTMENT ENCOUNTER    Room Number:    Date seen:  3/29/2025  Time seen: 06:50 EDT  PCP: Precious Suárez MD  Historian: Patient    Discussed/obtained information from independent historians: Patient    HPI:  Chief complaint: Right upper quadrant pain    A complete HPI/ROS/PMH/PSH/SH/FH are unobtainable due to: Nothing    Context:Marina Mitchell is a 29 y.o. female G3, P0, A2 who presents to the ED with c/o epigastric and right upper quadrant pain.  Symptom onset yesterday evening at roughly 9 PM.  Patient reports she has had gallbladder issues in the past and had to be admitted in February for the same.  Patient reports pain is exacerbated after eating.  At present the pain is subsided.  She states she is 8 weeks pregnant and was told in February while admitted that she would need to wait at least until the second trimester before she would be a candidate for cholecystectomy.  She denies vaginal bleeding or vaginal discharge.  She does reports she has had some pelvic cramping.    External (non-ED) record review: Ultrasound on 3/3/2025 showed multiple stones without wall thickening or pericholecystic fluid.  CBD was normal caliber.  No sonographic Ahll sign.  This was read by radiologist Nalini Salas.    Patient having OB ultrasound 3/3/2005 5-showed viable IUP at 5 weeks 2 days.    Chronic or social conditions impacting care:    ALLERGIES  Patient has no known allergies.    PAST MEDICAL HISTORY  Active Ambulatory Problems     Diagnosis Date Noted    Missed  2024    Metabolic syndrome X 2024    PCOS (polycystic ovarian syndrome) 2024    Abdominal pain 2025    Fatty infiltration of liver 2025     Resolved Ambulatory Problems     Diagnosis Date Noted    Abnormal uterine bleeding (AUB) 2024     Past Medical History:   Diagnosis Date    Fatty liver     Gallstones        PAST SURGICAL HISTORY  Past Surgical History:   Procedure Laterality Date     CLAVICLE SURGERY Right 10/31/2019       FAMILY HISTORY  Family History   Problem Relation Age of Onset    Lung cancer Mother     Lupus Mother     Drug abuse Mother     Prostate cancer Father     Seizures Father     Cervical cancer Maternal Grandmother         Cervical    Heart failure Paternal Grandmother     No Known Problems Half-Brother     No Known Problems Half-Sister        SOCIAL HISTORY  Social History     Socioeconomic History    Marital status: Single   Tobacco Use    Smoking status: Never     Passive exposure: Never    Smokeless tobacco: Never   Vaping Use    Vaping status: Never Used   Substance and Sexual Activity    Alcohol use: Not Currently     Comment: Maybe when or to dinner or special occasion    Drug use: Never    Sexual activity: Yes     Partners: Male     Birth control/protection: None       REVIEW OF SYSTEMS  Review of Systems    All systems reviewed and negative except for those discussed in HPI.     PHYSICAL EXAM    I have reviewed the triage vital signs and nursing notes.  Vitals:    03/29/25 0931   BP: 123/75   Pulse: 88   Resp:    Temp:    SpO2: 97%     Physical Exam    GENERAL: Morbidly obese, not distressed  HENT: nares patent  EYES: no scleral icterus  NECK: no ROM limitations  CV: regular rhythm, regular rate  RESPIRATORY: normal effort  ABDOMEN: Mild tenderness right upper quadrant.  : deferred  MUSCULOSKELETAL: no deformity  NEURO: alert, moves all extremities, follows commands  SKIN: warm, dry    LAB RESULTS  Recent Results (from the past 24 hours)   Comprehensive Metabolic Panel    Collection Time: 03/29/25  7:11 AM    Specimen: Blood   Result Value Ref Range    Glucose 98 65 - 99 mg/dL    BUN 7 6 - 20 mg/dL    Creatinine 0.64 0.57 - 1.00 mg/dL    Sodium 137 136 - 145 mmol/L    Potassium 4.1 3.5 - 5.2 mmol/L    Chloride 102 98 - 107 mmol/L    CO2 21.8 (L) 22.0 - 29.0 mmol/L    Calcium 9.7 8.6 - 10.5 mg/dL    Total Protein 7.9 6.0 - 8.5 g/dL    Albumin 3.7 3.5 - 5.2 g/dL     ALT (SGPT) 272 (H) 1 - 33 U/L    AST (SGOT) 333 (H) 1 - 32 U/L    Alkaline Phosphatase 98 39 - 117 U/L    Total Bilirubin 1.2 0.0 - 1.2 mg/dL    Globulin 4.2 gm/dL    A/G Ratio 0.9 g/dL    BUN/Creatinine Ratio 10.9 7.0 - 25.0    Anion Gap 13.2 5.0 - 15.0 mmol/L    eGFR 123.6 >60.0 mL/min/1.73   Lipase    Collection Time: 03/29/25  7:11 AM    Specimen: Blood   Result Value Ref Range    Lipase 41 13 - 60 U/L   CBC Auto Differential    Collection Time: 03/29/25  7:11 AM    Specimen: Blood   Result Value Ref Range    WBC 11.25 (H) 3.40 - 10.80 10*3/mm3    RBC 4.64 3.77 - 5.28 10*6/mm3    Hemoglobin 13.9 12.0 - 15.9 g/dL    Hematocrit 41.5 34.0 - 46.6 %    MCV 89.4 79.0 - 97.0 fL    MCH 30.0 26.6 - 33.0 pg    MCHC 33.5 31.5 - 35.7 g/dL    RDW 12.3 12.3 - 15.4 %    RDW-SD 39.8 37.0 - 54.0 fl    MPV 9.5 6.0 - 12.0 fL    Platelets 324 140 - 450 10*3/mm3    Neutrophil % 76.6 (H) 42.7 - 76.0 %    Lymphocyte % 16.1 (L) 19.6 - 45.3 %    Monocyte % 5.4 5.0 - 12.0 %    Eosinophil % 0.9 0.3 - 6.2 %    Basophil % 0.4 0.0 - 1.5 %    Immature Grans % 0.6 (H) 0.0 - 0.5 %    Neutrophils, Absolute 8.61 (H) 1.70 - 7.00 10*3/mm3    Lymphocytes, Absolute 1.81 0.70 - 3.10 10*3/mm3    Monocytes, Absolute 0.61 0.10 - 0.90 10*3/mm3    Eosinophils, Absolute 0.10 0.00 - 0.40 10*3/mm3    Basophils, Absolute 0.05 0.00 - 0.20 10*3/mm3    Immature Grans, Absolute 0.07 (H) 0.00 - 0.05 10*3/mm3    nRBC 0.0 0.0 - 0.2 /100 WBC   hCG, Quantitative, Pregnancy    Collection Time: 03/29/25  7:11 AM    Specimen: Blood   Result Value Ref Range    HCG Quantitative 79,188.00 mIU/mL   Urinalysis With Culture If Indicated - Urine, Clean Catch    Collection Time: 03/29/25  7:50 AM    Specimen: Urine, Clean Catch   Result Value Ref Range    Color, UA Yellow Yellow, Straw    Appearance, UA Clear Clear    pH, UA 6.0 5.0 - 8.0    Specific Gravity, UA 1.010 1.005 - 1.030    Glucose, UA Negative Negative    Ketones, UA Negative Negative    Bilirubin, UA Negative  Negative    Blood, UA Negative Negative    Protein, UA Negative Negative    Leuk Esterase, UA Trace (A) Negative    Nitrite, UA Negative Negative    Urobilinogen, UA 1.0 E.U./dL 0.2 - 1.0 E.U./dL   Urinalysis, Microscopic Only - Urine, Clean Catch    Collection Time: 03/29/25  7:50 AM    Specimen: Urine, Clean Catch   Result Value Ref Range    RBC, UA 0-2 None Seen, 0-2 /HPF    WBC, UA 6-10 (A) None Seen, 0-2 /HPF    Bacteria, UA 2+ (A) None Seen /HPF    Squamous Epithelial Cells, UA 3-6 (A) None Seen, 0-2 /HPF    Hyaline Casts, UA 0-2 None Seen /LPF    Methodology Automated Microscopy        Ordered the above labs and independently interpreted results.  My findings will be discussed in the ED course or medical decision making section below    RADIOLOGY RESULTS  US Ob Limited 1 + Fetuses  US Ob Limited 1 + Fetuses, US Ob Transvaginal  Result Date: 3/29/2025  ULTRASOUND OB  INDICATION: Abdominal pain. 6 to 7 weeks estimated gestation.  COMPARISON: OB ultrasound March 17, 2025  TECHNIQUE: Transabdominal and transvaginal ultrasound examination. Representative images were saved for review.  FINDINGS:  CLINICAL DATES EGA 8 weeks 5 days with JOSE LUIS November 3, 2025 based on LMP January 27, 2025.  GENERAL ASSESSMENT Gestational sac: Single intrauterine. Normal shape. Mean gestational sac diameter 31.8 mm = 8 weeks 3 days. Embryo crown-rump length (CRL) 16.9 mm = 8 weeks 1 day. Cardiac activity: 176 bpm. Yolk sac: 5-6 mm. Early placenta: Not visible at this gestational age. Amniotic fluid: Not accurately assessed at this gestational age. Other: No perigestational fluid collection demonstrated.  MATERNAL STRUCTURES Uterus: Anteverted position. Unremarkable. Cervix: Closed. Right Ovary/Adnexa: Not visualized due to bowel gas artifact. Left Ovary/Adnexa: Not visualized due to bowel gas artifact. Free Fluid: None      1. Luciano viable intrauterine pregnancy at EGA 8 weeks 1 day with JOSE LUIS November 7, 2025 based on crown-rump  length, which is concordant with expected dating based on given LMP. 2. No acute findings identified.   This report was finalized on 3/29/2025 8:25 AM by Dr. Matt Tobias M.D on Workstation: [id number]      US Gallbladder  Result Date: 3/29/2025  GALLBLADDER ULTRASOUND  HISTORY: 29-year-old female with gallstones. Right upper quadrant pain.  The examination was performed as an emergency procedure. The gallbladder is somewhat contracted and contains multiple small gallstones with posterior shadowing. There is no wall thickening and the common bile duct is normal in caliber, measuring 3 mm.  The liver is somewhat enlarged with increased echotexture suggesting an element of hepatic steatosis. The visualized pancreas and right kidney are unremarkable.  CONCLUSION: Somewhat contracted gallbladder with multiple gallstones. No evidence of biliary obstruction. Mild hepatomegaly with hepatic steatosis.  This report was finalized on 3/29/2025 7:13 AM by Dr. Abdulaziz Nguyen M.D on Workstation: [id number]         Ordered the above noted radiological studies.  Independently interpreted by me.  My findings will be discussed in the medical decision section below.     PROGRESS, DATA ANALYSIS, CONSULTS AND MEDICAL DECISION MAKING    Please note that this section constitutes my independent interpretation of clinical data including lab results, radiology, EKG's.  This constitutes my independent professional opinion regarding differential diagnosis and management of this patient.  It may include any factors such as history from outside sources, review of external records, social determinants of health, management of medications, response to those treatments, and discussions with other providers.    ED Course as of 03/29/25 1625   Sat Mar 29, 2025   0854 Patient had appointment with Dr. Luong on April 11, 2025 for recurrent biliary colic.  Patient's symptoms have resolved now and ultrasound shows no gallbladder wall thickening or  stranding.  His Eminase levels are elevated above where they have been.  And given patient's recurrent symptoms are going please call to general surgery. [RC]   1026 IMPRESSION:  1. Luciano viable intrauterine pregnancy at EGA 8 weeks 1 day with JOSE LUIS  November 7, 2025 based on crown-rump length, which is concordant with  expected dating based on given LMP.  2. No acute findings identified.        This report was finalized on 3/29/2025 8:25 AM by Dr. Matt Tobias M.D on Workstation: [id number]   [RC]   1107 Discussed patient's case with general surgery who will come and discuss the patient's case with the patient at bedside. [RC]   1300 Surgical option discussed with the patient by Dr. Bernal.  Patient declined surgery for now states she would like to continue with conservative measures for now and to follow-up with Dr. Luong.  Gallbladder eating plan discussed with the patient by myself and Dr. Bernal.  Patient knows to follow-up with OB/GYN or primary care in a week's time to have her LFTs rechecked.  She knows to return to the ER with any worsening symptoms or should she have any further concerns.  She will keep her appointment with Dr. Luong on 11 March 2025. [RC]      ED Course User Index  [RC] Artemio Gurrola III, PA     Orders placed during this visit:  Orders Placed This Encounter   Procedures    Urine Culture - Urine,    US Gallbladder    US Ob Limited 1 + Fetuses    US Ob Transvaginal    Comprehensive Metabolic Panel    Lipase    Urinalysis With Culture If Indicated - Urine, Clean Catch    CBC Auto Differential    hCG, Quantitative, Pregnancy    Urinalysis, Microscopic Only - Urine, Clean Catch    Inpatient General Surgery Consult    CBC & Differential            Medical Decision Making  Problems Addressed:  Biliary colic: complicated acute illness or injury  Calculus of gallbladder without cholecystitis without obstruction: complicated acute illness or injury  Elevated LFTs:  complicated acute illness or injury  First trimester pregnancy: complicated acute illness or injury    Amount and/or Complexity of Data Reviewed  Labs: ordered.  Radiology: ordered.    Risk  Prescription drug management.            DIAGNOSIS  Final diagnoses:   First trimester pregnancy   Biliary colic   Calculus of gallbladder without cholecystitis without obstruction   Elevated LFTs          Medication List        Changed      * ondansetron ODT 4 MG disintegrating tablet  Commonly known as: ZOFRAN-ODT  What changed: Another medication with the same name was added. Make sure you understand how and when to take each.     * ondansetron ODT 4 MG disintegrating tablet  Commonly known as: ZOFRAN-ODT  Take 1 tablet by mouth Every 8 (Eight) Hours As Needed for Vomiting or Nausea.  What changed: Another medication with the same name was added. Make sure you understand how and when to take each.     * ondansetron ODT 4 MG disintegrating tablet  Commonly known as: ZOFRAN-ODT  Place 1 tablet on the tongue 4 (Four) Times a Day As Needed for Vomiting or Nausea.  What changed: You were already taking a medication with the same name, and this prescription was added. Make sure you understand how and when to take each.           * This list has 3 medication(s) that are the same as other medications prescribed for you. Read the directions carefully, and ask your doctor or other care provider to review them with you.                   Where to Get Your Medications        These medications were sent to [facility] [id number] - [place], [place] - [address] [facility]  &  - [phone]  - [phone]   [address] [facility] [address]      Phone: [phone]   ondansetron ODT 4 MG disintegrating tablet         FOLLOW-UP  Precious Suárez MD  [address]  [address]  [place] [address] [phone]    In 1 week  Repeat liver enzymes checked    Your OB/GYN      Repeat evaluation liver enzymes  checked    Jerrell Luong MD  [address]  [address] [address]  [phone]    Schedule an appointment as soon as possible for a visit   For further evaluation and treatment        Latest Documented Vital Signs:  As of 16:25 EDT  BP- 123/75 HR- 88 Temp- 98.6 °F (37 °C) (Oral) O2 sat- 97%    Appropriate PPE utilized throughout this patient encounter to include mask, if indicated, per current protocol. Hand hygiene was performed before donning PPE and after removal when leaving the room.    Please note that portions of this were completed with a voice recognition program.     Note Disclaimer: At [facility], we believe that sharing information builds trust and better relationships. You are receiving this note because you are receiving care at [facility] or recently visited. It is possible you will see health information before a provider has talked with you about it. This kind of information can be easy to misunderstand. To help you fully understand what it means for your health, we urge you to discuss this note with your provider.                Artemio Gurrola III, PA  03/29/25 7159

## 2025-03-29 NOTE — CONSULTS
General Surgery H&P/Consultation      Impression/Plan: 29-year-old lady who is 8 weeks pregnant with symptomatic cholelithiasis/chronic cholecystitis.  We discussed proceeding with laparoscopic cholecystectomy during the first trimester versus trying to wait until the second trimester.  Risk and rationale for each approach was discussed with her.  She currently has follow-up scheduled with Dr. Luong in [place] on 4/11/2025.  She is feeling better and would like to postpone surgery at this time.  I discussed with her that if she continues to have these episodes then we will need to proceed with cholecystectomy in the first trimester.  If she were develop clear evidence of acute cholecystitis then cholecystectomy would also be necessary.    She also has history of fatty liver and has follow-up scheduled with gastroenterology as well.  Suspect transaminitis is multifactorial related to the symptomatic cholelithiasis and fatty liver disease.  Recommend recheck of liver enzymes in 1 week.      VTE Prophylaxis:  No VTE prophylaxis order currently exists.        CC: Abdominal pain    HPI:   Ms Marina Mitchell is a 29 y.o. female that presented to the hospital with epigastric and right upper quadrant pain that radiates to her back.  She is 8 weeks pregnant and was hospitalized for a similar episode in early March at [place].  She was seen by OB at that time and recommended that she wait until the second trimester to undergo cholecystectomy if possible.  She reports a similar episode earlier this week and then another episode last night.  She went to the emergency room on 3/25/2025 and did not require admission.  Pain starts at night and is located in the epigastrium and radiates to the right upper quadrant and then to her back.  Has had a few episodes of emesis related to this.    Past Medical History:   Past Medical History:   Diagnosis Date    Fatty liver     Gallstones        Past Surgical History:   Past Surgical  History:   Procedure Laterality Date    CLAVICLE SURGERY Right 10/31/2019       Medications:  (Not in a hospital admission)      No current facility-administered medications for this encounter.    Current Outpatient Medications:     doxylamine (Unisom SleepTabs) 25 MG tablet, Take 1 tablet by mouth Every Night., Disp: 30 tablet, Rfl: 1    famotidine (Pepcid) 20 MG tablet, Take 1 tablet by mouth 2 (Two) Times a Day., Disp: 60 tablet, Rfl: 1    nitrofurantoin, macrocrystal-monohydrate, (MACROBID) 100 MG capsule, , Disp: , Rfl:     ondansetron ODT (ZOFRAN-ODT) 4 MG disintegrating tablet, , Disp: , Rfl:     ondansetron ODT (ZOFRAN-ODT) 4 MG disintegrating tablet, Take 1 tablet by mouth Every 8 (Eight) Hours As Needed for Vomiting or Nausea., Disp: 30 tablet, Rfl: 1    prenatal vitamin (prenatal, CLASSIC, vitamin) tablet, Take 1 tablet by mouth Daily., Disp: , Rfl:     sucralfate (CARAFATE) 1 GM/10ML suspension, Take 10 mL by mouth 4 (Four) Times a Day., Disp: 420 mL, Rfl: 0    vitamin B-6 (PYRIDOXINE) 25 MG tablet, Take 1 tablet by mouth Every Night., Disp: 30 tablet, Rfl: 1     Allergies: No Known Allergies    Social History:   Social History     Socioeconomic History    Marital status: Single   Tobacco Use    Smoking status: Never     Passive exposure: Never    Smokeless tobacco: Never   Vaping Use    Vaping status: Never Used   Substance and Sexual Activity    Alcohol use: Not Currently     Comment: Maybe when or to dinner or special occasion    Drug use: Never    Sexual activity: Yes     Partners: Male     Birth control/protection: None       Family History:   Family History   Problem Relation Age of Onset    Lung cancer Mother     Lupus Mother     Drug abuse Mother     Prostate cancer Father     Seizures Father     Cervical cancer Maternal Grandmother         Cervical    Heart failure Paternal Grandmother     No Known Problems Half-Brother     No Known Problems Half-Sister        Review of Systems:  A comprehensive  review of systems was negative except for the following positives: Per HPI    Physical Exam:   Vitals:    03/29/25 0931   BP: 123/75   Pulse: 88   Resp:    Temp:    SpO2: 97%     BMI: Body mass index is 46.94 kg/m².   120 kg (265 lb)    No intake or output data in the 24 hours ending 03/29/25 1250    GENERAL: no acute distress, awake and alert  RESPIRATORY: symmetric excursion bilaterally, normal work of breathing  CARDIOVASCULAR: Regular rate, well perfused  GASTROINTESTINAL: Soft, negative Hall sign, minimal tenderness of the right ribs to deep palpation      Pertinent labs:   Results from last 7 days   Lab Units 03/29/25  0711 03/25/25  2314   WBC 10*3/mm3 11.25* 14.50*   HEMOGLOBIN g/dL 13.9 13.9   HEMATOCRIT % 41.5 41.8   PLATELETS 10*3/mm3 324 322     Results from last 7 days   Lab Units 03/29/25  0711 03/25/25  2314   SODIUM mmol/L 137 135*   POTASSIUM mmol/L 4.1 4.3   CHLORIDE mmol/L 102 100   CO2 mmol/L 21.8* 24.5   BUN mg/dL 7 13   CREATININE mg/dL 0.64 0.61   CALCIUM mg/dL 9.7 9.5   BILIRUBIN mg/dL 1.2 0.3   ALK PHOS U/L 98 55   ALT (SGPT) U/L 272* 37*   AST (SGOT) U/L 333* 20   GLUCOSE mg/dL 98 105*       IMAGING:  Ultrasound gallbladder reviewed, cholelithiasis without gallbladder wall thickening, gallbladder is nondistended, no pericholecystic fluid          Dominick Bernal MD  General and Endoscopic Surgery  [facility]    [address] [place] [address], [address]  [place] [address]  P: [phone]  F: [phone]

## 2025-03-29 NOTE — DISCHARGE INSTRUCTIONS
Avoid fatty, spicy, fried food.  No fast food whatsoever.  Very lean meats are probably okay.  I would stick to vegetables and bland foods until you see Dr. Luong.  If it takes good spit it out.    Return ER with fever, worsening symptoms, or should you have any further concerns.    Tylenol according to the over-the-counter instructions okay for pain.  Do not exceed more than 3500 mg in a 24-hour period as this can harm the liver.

## 2025-03-30 LAB — BACTERIA SPEC AEROBE CULT: NO GROWTH

## 2025-03-31 ENCOUNTER — TELEPHONE (OUTPATIENT)
Dept: OBSTETRICS AND GYNECOLOGY | Facility: CLINIC | Age: 30
End: 2025-03-31

## 2025-03-31 NOTE — TELEPHONE ENCOUNTER
Provider: DR. GARY    Caller: Marina Mitchell    Relationship to Patient: Self    Pharmacy: [facility] @ [place], [place]    Phone Number: [phone]    Reason for Call: NEW OB PT (WILL BE SEEN 4-8) WENT TO ER [facility] - NOTES IN CHART FOR UPPER R/QUADRANT PAIN. DID U/S (PT SAYS WAS NORMAL) SHE IS DEALING WITH GALLBLADDER ISSUES SINCE SHE WAS PREG. SHE DIDN'T KNOW IF DR. GARY WANTS HER TO BE SEEN SOONER OR NOT. HAS GALLBLADDER PAIN ON/OFF.    When was the patient last seen: 03-17-25

## 2025-04-01 ENCOUNTER — OFFICE VISIT (OUTPATIENT)
Dept: INTERNAL MEDICINE | Facility: CLINIC | Age: 30
End: 2025-04-01
Payer: COMMERCIAL

## 2025-04-01 ENCOUNTER — LAB (OUTPATIENT)
Dept: LAB | Facility: HOSPITAL | Age: 30
End: 2025-04-01
Payer: COMMERCIAL

## 2025-04-01 VITALS
TEMPERATURE: 98.7 F | HEIGHT: 63 IN | RESPIRATION RATE: 16 BRPM | HEART RATE: 87 BPM | WEIGHT: 260.2 LBS | DIASTOLIC BLOOD PRESSURE: 70 MMHG | SYSTOLIC BLOOD PRESSURE: 118 MMHG | OXYGEN SATURATION: 97 % | BODY MASS INDEX: 46.1 KG/M2

## 2025-04-01 DIAGNOSIS — R74.01 TRANSAMINITIS: Primary | ICD-10-CM

## 2025-04-01 DIAGNOSIS — K80.20 GALLSTONES: ICD-10-CM

## 2025-04-01 LAB
ALBUMIN SERPL-MCNC: 4 G/DL (ref 3.5–5.2)
ALBUMIN/GLOB SERPL: 1.1 G/DL
ALP SERPL-CCNC: 95 U/L (ref 39–117)
ALT SERPL W P-5'-P-CCNC: 143 U/L (ref 1–33)
ANION GAP SERPL CALCULATED.3IONS-SCNC: 9.7 MMOL/L (ref 5–15)
AST SERPL-CCNC: 37 U/L (ref 1–32)
BILIRUB SERPL-MCNC: 0.5 MG/DL (ref 0–1.2)
BUN SERPL-MCNC: 8 MG/DL (ref 6–20)
BUN/CREAT SERPL: 11.6 (ref 7–25)
CALCIUM SPEC-SCNC: 9.6 MG/DL (ref 8.6–10.5)
CHLORIDE SERPL-SCNC: 103 MMOL/L (ref 98–107)
CO2 SERPL-SCNC: 23.3 MMOL/L (ref 22–29)
CREAT SERPL-MCNC: 0.69 MG/DL (ref 0.57–1)
DEPRECATED RDW RBC AUTO: 41.3 FL (ref 37–54)
EGFRCR SERPLBLD CKD-EPI 2021: 121.4 ML/MIN/1.73
ERYTHROCYTE [DISTWIDTH] IN BLOOD BY AUTOMATED COUNT: 12.4 % (ref 12.3–15.4)
GLOBULIN UR ELPH-MCNC: 3.5 GM/DL
GLUCOSE SERPL-MCNC: 90 MG/DL (ref 65–99)
HCT VFR BLD AUTO: 42.3 % (ref 34–46.6)
HGB BLD-MCNC: 13.3 G/DL (ref 12–15.9)
MCH RBC QN AUTO: 28.8 PG (ref 26.6–33)
MCHC RBC AUTO-ENTMCNC: 31.4 G/DL (ref 31.5–35.7)
MCV RBC AUTO: 91.6 FL (ref 79–97)
PLATELET # BLD AUTO: 314 10*3/MM3 (ref 140–450)
PMV BLD AUTO: 9.6 FL (ref 6–12)
POTASSIUM SERPL-SCNC: 4.2 MMOL/L (ref 3.5–5.2)
PROT SERPL-MCNC: 7.5 G/DL (ref 6–8.5)
RBC # BLD AUTO: 4.62 10*6/MM3 (ref 3.77–5.28)
SODIUM SERPL-SCNC: 136 MMOL/L (ref 136–145)
WBC NRBC COR # BLD AUTO: 14.33 10*3/MM3 (ref 3.4–10.8)

## 2025-04-01 PROCEDURE — 80053 COMPREHEN METABOLIC PANEL: CPT | Performed by: INTERNAL MEDICINE

## 2025-04-01 PROCEDURE — 36415 COLL VENOUS BLD VENIPUNCTURE: CPT | Performed by: INTERNAL MEDICINE

## 2025-04-01 PROCEDURE — 85027 COMPLETE CBC AUTOMATED: CPT | Performed by: INTERNAL MEDICINE

## 2025-04-01 PROCEDURE — 99214 OFFICE O/P EST MOD 30 MIN: CPT | Performed by: INTERNAL MEDICINE

## 2025-04-01 NOTE — PROGRESS NOTES
"Chief Complaint  Abdominal Pain (Gallbladder attack (ED follow up) - no attack since ED visit (Seen in [place] - sent to [facility]))    Subjective        Marina Mitchell presents to [facility]  Abdominal Pain        Dr. Gonzales is her OB.  She sees him on 4/8.  Seeing General surgery tomorrow.     Objective   Vital Signs:  /70 (BP Location: Left arm, Patient Position: Sitting, Cuff Size: Large Adult)   Pulse 87   Temp 98.7 °F (37.1 °C) (Infrared)   Resp 16   Ht 160 cm (63\")   Wt 118 kg (260 lb 3.2 oz)   SpO2 97%   BMI 46.09 kg/m²   Estimated body mass index is 46.09 kg/m² as calculated from the following:    Height as of this encounter: 160 cm (63\").    Weight as of this encounter: 118 kg (260 lb 3.2 oz).            Physical Exam  Vitals reviewed.   Constitutional:       General: She is not in acute distress.     Appearance: Normal appearance.   HENT:      Head: Normocephalic and atraumatic.      Nose: Nose normal.      Mouth/Throat:      Mouth: Mucous membranes are moist.   Eyes:      Conjunctiva/sclera: Conjunctivae normal.   Pulmonary:      Effort: Pulmonary effort is normal.   Abdominal:      Tenderness: There is abdominal tenderness. There is no guarding or rebound.   Skin:     General: Skin is warm and dry.   Neurological:      General: No focal deficit present.      Mental Status: She is alert.   Psychiatric:         Mood and Affect: Mood normal.        Result Review :  The following data was reviewed by: Ashanti Howell MD on 04/01/2025:  Common labs          3/25/2025    23:14 3/29/2025    07:11 4/1/2025    12:25   Common Labs   Glucose 105  98  90    BUN 13  7  8    Creatinine 0.61  0.64  0.69    Sodium 135  137  136    Potassium 4.3  4.1  4.2    Chloride 100  102  103    Calcium 9.5  9.7  9.6    Albumin 4.4  3.7  4.0    Total Bilirubin 0.3  1.2  0.5    Alkaline Phosphatase 55  98  95    AST (SGOT) 20  333  37    ALT (SGPT) 37  272  143    WBC 14.50  " 11.25  14.33    Hemoglobin 13.9  13.9  13.3    Hematocrit 41.8  41.5  42.3    Platelets 322  324  314      Data reviewed : reviewed labs, ER note from 3/29/25           Assessment and Plan   Diagnoses and all orders for this visit:    1. Transaminitis (Primary)  -     CBC (No Diff)  -     Comprehensive Metabolic Panel    2. Gallstones  -     CBC (No Diff)  -     Comprehensive Metabolic Panel      Has appt with gen surg, GI.  Discussed mental challenge of pregnancy cravings + low fat diet.     Ideas for food plan:  Smoothie for breakfast can stay  Lunch: can do salad: advised on lemon/small amount of olive oil/salt and then rub into lettuce  Dinner: broth, discussed fresh sourdough bread being a good thing as well    Can try candied shila, shila in sushi aisle and use it PRN.  Using rare Zofran as well.          Follow Up   Follow-up with general surgery and GI and let us know if she needs to see us as well at some point.  Also has follow-up with OB.  Patient was given instructions and counseling regarding her condition or for health maintenance advice. Please see specific information pulled into the AVS if appropriate.

## 2025-04-02 ENCOUNTER — OFFICE VISIT (OUTPATIENT)
Dept: SURGERY | Facility: CLINIC | Age: 30
End: 2025-04-02
Payer: COMMERCIAL

## 2025-04-02 VITALS
SYSTOLIC BLOOD PRESSURE: 116 MMHG | BODY MASS INDEX: 46.95 KG/M2 | DIASTOLIC BLOOD PRESSURE: 74 MMHG | HEIGHT: 63 IN | WEIGHT: 265 LBS

## 2025-04-02 DIAGNOSIS — K80.50 CHOLEDOCHOLITHIASIS: ICD-10-CM

## 2025-04-02 DIAGNOSIS — K81.9 CHOLECYSTITIS: Primary | ICD-10-CM

## 2025-04-02 NOTE — PROGRESS NOTES
Colorectal & General Surgery  History & Physical    Patient: Marina Mitchell  YOB: 1995  MRN: [id number]      Assessment  Marina Mitchell is a 29 y.o. female of 9 weeks gestation who presents today with right upper quadrant pain and cholelithiasis on recent gallbladder ultrasound.  CBC and CMP performed yesterday 4/1/2025 showed an elevated WBC at 14.33, and transaminitis downtrending since her ER visit last week.  Altogether, her presentation is concerning for an episode of transient choledocholithiasis during her emergency room stay over the weekend.  While there certainly is risk to her pregnancy and her fetus with general anesthesia, those risks are  by the risk of sepsis secondary to choledocholithiasis, cholecystitis and cholangitis.  At this time, I recommend proceeding with laparoscopic cholecystectomy with IOC.  Risk benefits and alternatives of the procedure were discussed as well as the risk of bile duct injury, bile leak, bleeding, infection, and damage to surrounding structures. All questions were answered, patient was willing to proceed with this plan of care.    Plan  Laparoscopic cholecystectomy with IOC    Chief Complaint: Cholelithiasis, RUQ pain    History of Present Illness   Marina Mitchell is a 29 y.o. female of 9 weeks gestation who presents today with cholelithiasis and transaminitis.  She was seen in the ER last week for these symptoms where they obtained a gallbladder ultrasound which showed cholelithiasis without evidence of biliary obstruction.  She complains of right upper quadrant pain which radiates to her epigastrium and right flank.  The pain is not associated with eating, but often occurs at night and is associated with vomiting.  She has tried adjusting her diet, but this has not shown any improvement in her symptoms. She did have similar episodes of pain several years ago in high school, but has not had any further issues until last week.     Past Medical History    Past Medical History:   Diagnosis Date    Fatty liver     Gallstones       Past Surgical History   Past Surgical History:   Procedure Laterality Date    CLAVICLE SURGERY Right 10/31/2019     Social History  Social History     Socioeconomic History    Marital status: Single   Tobacco Use    Smoking status: Never     Passive exposure: Never    Smokeless tobacco: Never   Vaping Use    Vaping status: Never Used   Substance and Sexual Activity    Alcohol use: Not Currently     Comment: Maybe when or to dinner or special occasion    Drug use: Never    Sexual activity: Yes     Partners: Male     Birth control/protection: None     Family History  Family History   Problem Relation Age of Onset    Lung cancer Mother     Lupus Mother     Drug abuse Mother     Prostate cancer Father     Seizures Father     Cervical cancer Maternal Grandmother         Cervical    Heart failure Paternal Grandmother     No Known Problems Half-Brother     No Known Problems Half-Sister      Review of Systems  Negative except as documented in the HPI.     Allergies  No Known Allergies    Medications    Current Outpatient Medications:     doxylamine (Unisom SleepTabs) 25 MG tablet, Take 1 tablet by mouth Every Night., Disp: 30 tablet, Rfl: 1    famotidine (Pepcid) 20 MG tablet, Take 1 tablet by mouth 2 (Two) Times a Day., Disp: 60 tablet, Rfl: 1    ondansetron ODT (ZOFRAN-ODT) 4 MG disintegrating tablet, Take 1 tablet by mouth Every 8 (Eight) Hours As Needed for Vomiting or Nausea., Disp: 30 tablet, Rfl: 1    prenatal vitamin (prenatal, CLASSIC, vitamin) tablet, Take 1 tablet by mouth Daily., Disp: , Rfl:     sucralfate (CARAFATE) 1 GM/10ML suspension, Take 10 mL by mouth 4 (Four) Times a Day., Disp: 420 mL, Rfl: 0    vitamin B-6 (PYRIDOXINE) 25 MG tablet, Take 1 tablet by mouth Every Night., Disp: 30 tablet, Rfl: 1    Vital Signs  Vitals:    04/02/25 0943   BP: 116/74        Physical Exam  Constitutional: Resting comfortably, no acute  distress  Neck: Supple, trachea midline  Respiratory: No increased work of breathing, Symmetric excursion  Cardiovascular: Well pefursed, no jugular venous distention evident   Abdominal: Soft, non-distended  Lymphatics: No cervical or suprascapular adenopathy  Skin: Warm, dry, no rash on visualized skin surfaces  Musculoskeletal: Symmetric strength, no obvious gross abnormalities  Psychiatric: Alert and oriented ×3, normal affect      Laboratory Results  I have personally reviewed CBC and CMP from 4/1/2025.  WBC 14.33  AST 37 T. bili 0.5.  Lipase 41.    Radiology  I have personally reviewed the gallbladder ultrasound from 3/29/2025 showing a somewhat contracted gallbladder with multiple gallstones, and no evidence of biliary obstruction.  There is mild hepatomegaly with hepatic steatosis.           Laureano Arita MD  Colorectal & General Surgery  [facility]    [address] [place] [address], [address]  [place], [place], [address]  P: [phone]  F: [phone]

## 2025-04-02 NOTE — H&P (VIEW-ONLY)
Colorectal & General Surgery  History & Physical    Patient: Marina Mitchell  YOB: 1995  MRN: [id number]      Assessment  Marina Mitchell is a 29 y.o. female of 9 weeks gestation who presents today with right upper quadrant pain and cholelithiasis on recent gallbladder ultrasound.  CBC and CMP performed yesterday 4/1/2025 showed an elevated WBC at 14.33, and transaminitis downtrending since her ER visit last week.  Altogether, her presentation is concerning for an episode of transient choledocholithiasis during her emergency room stay over the weekend.  While there certainly is risk to her pregnancy and her fetus with general anesthesia, those risks are  by the risk of sepsis secondary to choledocholithiasis, cholecystitis and cholangitis.  At this time, I recommend proceeding with laparoscopic cholecystectomy with IOC.  Risk benefits and alternatives of the procedure were discussed as well as the risk of bile duct injury, bile leak, bleeding, infection, and damage to surrounding structures. All questions were answered, patient was willing to proceed with this plan of care.    Plan  Laparoscopic cholecystectomy with IOC    Chief Complaint: Cholelithiasis, RUQ pain    History of Present Illness   Marina Mitchell is a 29 y.o. female of 9 weeks gestation who presents today with cholelithiasis and transaminitis.  She was seen in the ER last week for these symptoms where they obtained a gallbladder ultrasound which showed cholelithiasis without evidence of biliary obstruction.  She complains of right upper quadrant pain which radiates to her epigastrium and right flank.  The pain is not associated with eating, but often occurs at night and is associated with vomiting.  She has tried adjusting her diet, but this has not shown any improvement in her symptoms. She did have similar episodes of pain several years ago in high school, but has not had any further issues until last week.     Past Medical History    Past Medical History:   Diagnosis Date    Fatty liver     Gallstones       Past Surgical History   Past Surgical History:   Procedure Laterality Date    CLAVICLE SURGERY Right 10/31/2019     Social History  Social History     Socioeconomic History    Marital status: Single   Tobacco Use    Smoking status: Never     Passive exposure: Never    Smokeless tobacco: Never   Vaping Use    Vaping status: Never Used   Substance and Sexual Activity    Alcohol use: Not Currently     Comment: Maybe when or to dinner or special occasion    Drug use: Never    Sexual activity: Yes     Partners: Male     Birth control/protection: None     Family History  Family History   Problem Relation Age of Onset    Lung cancer Mother     Lupus Mother     Drug abuse Mother     Prostate cancer Father     Seizures Father     Cervical cancer Maternal Grandmother         Cervical    Heart failure Paternal Grandmother     No Known Problems Half-Brother     No Known Problems Half-Sister      Review of Systems  Negative except as documented in the HPI.     Allergies  No Known Allergies    Medications    Current Outpatient Medications:     doxylamine (Unisom SleepTabs) 25 MG tablet, Take 1 tablet by mouth Every Night., Disp: 30 tablet, Rfl: 1    famotidine (Pepcid) 20 MG tablet, Take 1 tablet by mouth 2 (Two) Times a Day., Disp: 60 tablet, Rfl: 1    ondansetron ODT (ZOFRAN-ODT) 4 MG disintegrating tablet, Take 1 tablet by mouth Every 8 (Eight) Hours As Needed for Vomiting or Nausea., Disp: 30 tablet, Rfl: 1    prenatal vitamin (prenatal, CLASSIC, vitamin) tablet, Take 1 tablet by mouth Daily., Disp: , Rfl:     sucralfate (CARAFATE) 1 GM/10ML suspension, Take 10 mL by mouth 4 (Four) Times a Day., Disp: 420 mL, Rfl: 0    vitamin B-6 (PYRIDOXINE) 25 MG tablet, Take 1 tablet by mouth Every Night., Disp: 30 tablet, Rfl: 1    Vital Signs  Vitals:    04/02/25 0943   BP: 116/74        Physical Exam  Constitutional: Resting comfortably, no acute  distress  Neck: Supple, trachea midline  Respiratory: No increased work of breathing, Symmetric excursion  Cardiovascular: Well pefursed, no jugular venous distention evident   Abdominal: Soft, non-distended  Lymphatics: No cervical or suprascapular adenopathy  Skin: Warm, dry, no rash on visualized skin surfaces  Musculoskeletal: Symmetric strength, no obvious gross abnormalities  Psychiatric: Alert and oriented ×3, normal affect      Laboratory Results  I have personally reviewed CBC and CMP from 4/1/2025.  WBC 14.33  AST 37 T. bili 0.5.  Lipase 41.    Radiology  I have personally reviewed the gallbladder ultrasound from 3/29/2025 showing a somewhat contracted gallbladder with multiple gallstones, and no evidence of biliary obstruction.  There is mild hepatomegaly with hepatic steatosis.           Laureano Arita MD  Colorectal & General Surgery  [facility]    [address] [place] [address], [address]  [place], [place], [address]  P: [phone]  F: [phone]

## 2025-04-03 PROBLEM — K80.50 CHOLEDOCHOLITHIASIS: Status: ACTIVE | Noted: 2025-04-02

## 2025-04-03 PROBLEM — K81.9 CHOLECYSTITIS: Status: ACTIVE | Noted: 2025-04-02

## 2025-04-04 ENCOUNTER — ANESTHESIA EVENT (OUTPATIENT)
Dept: PERIOP | Facility: HOSPITAL | Age: 30
End: 2025-04-04
Payer: COMMERCIAL

## 2025-04-04 ENCOUNTER — APPOINTMENT (OUTPATIENT)
Dept: GENERAL RADIOLOGY | Facility: HOSPITAL | Age: 30
End: 2025-04-04
Payer: COMMERCIAL

## 2025-04-04 ENCOUNTER — ANESTHESIA (OUTPATIENT)
Dept: PERIOP | Facility: HOSPITAL | Age: 30
End: 2025-04-04
Payer: COMMERCIAL

## 2025-04-04 ENCOUNTER — HOSPITAL ENCOUNTER (OUTPATIENT)
Facility: HOSPITAL | Age: 30
Setting detail: HOSPITAL OUTPATIENT SURGERY
Discharge: HOME | End: 2025-04-04
Attending: SURGERY | Admitting: SURGERY
Payer: COMMERCIAL

## 2025-04-04 VITALS
HEART RATE: 91 BPM | DIASTOLIC BLOOD PRESSURE: 74 MMHG | TEMPERATURE: 98 F | OXYGEN SATURATION: 96 % | SYSTOLIC BLOOD PRESSURE: 126 MMHG | RESPIRATION RATE: 17 BRPM

## 2025-04-04 DIAGNOSIS — K80.50 CHOLEDOCHOLITHIASIS: ICD-10-CM

## 2025-04-04 DIAGNOSIS — K81.9 CHOLECYSTITIS: ICD-10-CM

## 2025-04-04 PROCEDURE — 88304 TISSUE EXAM BY PATHOLOGIST: CPT | Performed by: SURGERY

## 2025-04-04 PROCEDURE — 25510000002 IOTHALAMATE 60 % SOLUTION: Performed by: SURGERY

## 2025-04-04 PROCEDURE — 25010000002 FAMOTIDINE 10 MG/ML SOLUTION: Performed by: STUDENT IN AN ORGANIZED HEALTH CARE EDUCATION/TRAINING PROGRAM

## 2025-04-04 PROCEDURE — 25010000002 FENTANYL CITRATE (PF) 50 MCG/ML SOLUTION: Performed by: NURSE ANESTHETIST, CERTIFIED REGISTERED

## 2025-04-04 PROCEDURE — 25810000003 LACTATED RINGERS PER 1000 ML: Performed by: STUDENT IN AN ORGANIZED HEALTH CARE EDUCATION/TRAINING PROGRAM

## 2025-04-04 PROCEDURE — 25010000002 CEFOXITIN PER 1 G: Performed by: SURGERY

## 2025-04-04 PROCEDURE — 25010000002 DROPERIDOL PER 5 MG: Performed by: NURSE ANESTHETIST, CERTIFIED REGISTERED

## 2025-04-04 PROCEDURE — 25010000002 GLUCAGON (RDNA) PER 1 MG: Performed by: NURSE ANESTHETIST, CERTIFIED REGISTERED

## 2025-04-04 PROCEDURE — 25010000002 SUCCINYLCHOLINE PER 20 MG: Performed by: NURSE ANESTHETIST, CERTIFIED REGISTERED

## 2025-04-04 PROCEDURE — 25010000002 PROPOFOL 10 MG/ML EMULSION: Performed by: NURSE ANESTHETIST, CERTIFIED REGISTERED

## 2025-04-04 PROCEDURE — 25010000002 SUGAMMADEX 200 MG/2ML SOLUTION: Performed by: NURSE ANESTHETIST, CERTIFIED REGISTERED

## 2025-04-04 PROCEDURE — 74300 X-RAY BILE DUCTS/PANCREAS: CPT

## 2025-04-04 PROCEDURE — 25010000002 ONDANSETRON PER 1 MG: Performed by: NURSE ANESTHETIST, CERTIFIED REGISTERED

## 2025-04-04 PROCEDURE — 25010000002 LIDOCAINE 2% SOLUTION: Performed by: NURSE ANESTHETIST, CERTIFIED REGISTERED

## 2025-04-04 PROCEDURE — 47563 LAPARO CHOLECYSTECTOMY/GRAPH: CPT

## 2025-04-04 PROCEDURE — 25010000002 DEXAMETHASONE SODIUM PHOSPHATE 20 MG/5ML SOLUTION: Performed by: NURSE ANESTHETIST, CERTIFIED REGISTERED

## 2025-04-04 PROCEDURE — 47563 LAPARO CHOLECYSTECTOMY/GRAPH: CPT | Performed by: SURGERY

## 2025-04-04 PROCEDURE — 25810000003 SODIUM CHLORIDE PER 500 ML: Performed by: SURGERY

## 2025-04-04 DEVICE — CLIP LIGAT VASC HORIZON TI MD/LG GRN 6CT: Type: IMPLANTABLE DEVICE | Site: ABDOMEN | Status: FUNCTIONAL

## 2025-04-04 RX ORDER — PROMETHAZINE HYDROCHLORIDE 25 MG/1
25 TABLET ORAL ONCE AS NEEDED
Status: DISCONTINUED | OUTPATIENT
Start: 2025-04-04 | End: 2025-04-04 | Stop reason: HOSPADM

## 2025-04-04 RX ORDER — ATROPINE SULFATE 0.4 MG/ML
0.4 INJECTION, SOLUTION INTRAMUSCULAR; INTRAVENOUS; SUBCUTANEOUS ONCE AS NEEDED
Status: DISCONTINUED | OUTPATIENT
Start: 2025-04-04 | End: 2025-04-04 | Stop reason: HOSPADM

## 2025-04-04 RX ORDER — FLUMAZENIL 0.1 MG/ML
0.2 INJECTION INTRAVENOUS AS NEEDED
Status: DISCONTINUED | OUTPATIENT
Start: 2025-04-04 | End: 2025-04-04 | Stop reason: HOSPADM

## 2025-04-04 RX ORDER — ONDANSETRON 2 MG/ML
4 INJECTION INTRAMUSCULAR; INTRAVENOUS ONCE AS NEEDED
Status: DISCONTINUED | OUTPATIENT
Start: 2025-04-04 | End: 2025-04-04 | Stop reason: HOSPADM

## 2025-04-04 RX ORDER — IBUPROFEN 600 MG/1
TABLET ORAL AS NEEDED
Status: DISCONTINUED | OUTPATIENT
Start: 2025-04-04 | End: 2025-04-04 | Stop reason: SURG

## 2025-04-04 RX ORDER — IPRATROPIUM BROMIDE AND ALBUTEROL SULFATE 2.5; .5 MG/3ML; MG/3ML
3 SOLUTION RESPIRATORY (INHALATION) ONCE AS NEEDED
Status: DISCONTINUED | OUTPATIENT
Start: 2025-04-04 | End: 2025-04-04 | Stop reason: HOSPADM

## 2025-04-04 RX ORDER — LIDOCAINE HYDROCHLORIDE 20 MG/ML
INJECTION, SOLUTION INFILTRATION; PERINEURAL AS NEEDED
Status: DISCONTINUED | OUTPATIENT
Start: 2025-04-04 | End: 2025-04-04 | Stop reason: SURG

## 2025-04-04 RX ORDER — OXYCODONE AND ACETAMINOPHEN 7.5; 325 MG/1; MG/1
1 TABLET ORAL EVERY 4 HOURS PRN
Status: DISCONTINUED | OUTPATIENT
Start: 2025-04-04 | End: 2025-04-04 | Stop reason: HOSPADM

## 2025-04-04 RX ORDER — FENTANYL CITRATE 50 UG/ML
INJECTION, SOLUTION INTRAMUSCULAR; INTRAVENOUS AS NEEDED
Status: DISCONTINUED | OUTPATIENT
Start: 2025-04-04 | End: 2025-04-04 | Stop reason: SURG

## 2025-04-04 RX ORDER — HYDROMORPHONE HYDROCHLORIDE 1 MG/ML
0.5 INJECTION, SOLUTION INTRAMUSCULAR; INTRAVENOUS; SUBCUTANEOUS
Status: DISCONTINUED | OUTPATIENT
Start: 2025-04-04 | End: 2025-04-04 | Stop reason: HOSPADM

## 2025-04-04 RX ORDER — PROMETHAZINE HYDROCHLORIDE 25 MG/1
25 SUPPOSITORY RECTAL ONCE AS NEEDED
Status: DISCONTINUED | OUTPATIENT
Start: 2025-04-04 | End: 2025-04-04 | Stop reason: HOSPADM

## 2025-04-04 RX ORDER — FENTANYL CITRATE 50 UG/ML
50 INJECTION, SOLUTION INTRAMUSCULAR; INTRAVENOUS ONCE AS NEEDED
Status: DISCONTINUED | OUTPATIENT
Start: 2025-04-04 | End: 2025-04-04 | Stop reason: HOSPADM

## 2025-04-04 RX ORDER — HYDRALAZINE HYDROCHLORIDE 20 MG/ML
5 INJECTION INTRAMUSCULAR; INTRAVENOUS
Status: DISCONTINUED | OUTPATIENT
Start: 2025-04-04 | End: 2025-04-04 | Stop reason: HOSPADM

## 2025-04-04 RX ORDER — NALOXONE HCL 0.4 MG/ML
0.2 VIAL (ML) INJECTION AS NEEDED
Status: DISCONTINUED | OUTPATIENT
Start: 2025-04-04 | End: 2025-04-04 | Stop reason: HOSPADM

## 2025-04-04 RX ORDER — FAMOTIDINE 10 MG/ML
20 INJECTION, SOLUTION INTRAVENOUS ONCE
Status: COMPLETED | OUTPATIENT
Start: 2025-04-04 | End: 2025-04-04

## 2025-04-04 RX ORDER — DEXAMETHASONE SODIUM PHOSPHATE 4 MG/ML
INJECTION, SOLUTION INTRA-ARTICULAR; INTRALESIONAL; INTRAMUSCULAR; INTRAVENOUS; SOFT TISSUE AS NEEDED
Status: DISCONTINUED | OUTPATIENT
Start: 2025-04-04 | End: 2025-04-04 | Stop reason: SURG

## 2025-04-04 RX ORDER — LABETALOL HYDROCHLORIDE 5 MG/ML
5 INJECTION, SOLUTION INTRAVENOUS
Status: DISCONTINUED | OUTPATIENT
Start: 2025-04-04 | End: 2025-04-04 | Stop reason: HOSPADM

## 2025-04-04 RX ORDER — SODIUM CHLORIDE 0.9 % (FLUSH) 0.9 %
3 SYRINGE (ML) INJECTION EVERY 12 HOURS SCHEDULED
Status: DISCONTINUED | OUTPATIENT
Start: 2025-04-04 | End: 2025-04-04 | Stop reason: HOSPADM

## 2025-04-04 RX ORDER — OXYCODONE HYDROCHLORIDE 5 MG/1
5 TABLET ORAL EVERY 4 HOURS PRN
Qty: 12 TABLET | Refills: 0 | Status: SHIPPED | OUTPATIENT
Start: 2025-04-04 | End: 2025-04-11

## 2025-04-04 RX ORDER — SODIUM CHLORIDE, SODIUM LACTATE, POTASSIUM CHLORIDE, CALCIUM CHLORIDE 600; 310; 30; 20 MG/100ML; MG/100ML; MG/100ML; MG/100ML
9 INJECTION, SOLUTION INTRAVENOUS CONTINUOUS
Status: DISCONTINUED | OUTPATIENT
Start: 2025-04-04 | End: 2025-04-04 | Stop reason: HOSPADM

## 2025-04-04 RX ORDER — PROPOFOL 10 MG/ML
VIAL (ML) INTRAVENOUS AS NEEDED
Status: DISCONTINUED | OUTPATIENT
Start: 2025-04-04 | End: 2025-04-04 | Stop reason: SURG

## 2025-04-04 RX ORDER — SODIUM CHLORIDE 9 MG/ML
INJECTION, SOLUTION INTRAVENOUS AS NEEDED
Status: DISCONTINUED | OUTPATIENT
Start: 2025-04-04 | End: 2025-04-04 | Stop reason: HOSPADM

## 2025-04-04 RX ORDER — HYDROCODONE BITARTRATE AND ACETAMINOPHEN 5; 325 MG/1; MG/1
1 TABLET ORAL ONCE AS NEEDED
Status: DISCONTINUED | OUTPATIENT
Start: 2025-04-04 | End: 2025-04-04 | Stop reason: HOSPADM

## 2025-04-04 RX ORDER — DROPERIDOL 2.5 MG/ML
0.62 INJECTION, SOLUTION INTRAMUSCULAR; INTRAVENOUS
Status: DISCONTINUED | OUTPATIENT
Start: 2025-04-04 | End: 2025-04-04 | Stop reason: HOSPADM

## 2025-04-04 RX ORDER — DIPHENHYDRAMINE HYDROCHLORIDE 50 MG/ML
12.5 INJECTION, SOLUTION INTRAMUSCULAR; INTRAVENOUS
Status: DISCONTINUED | OUTPATIENT
Start: 2025-04-04 | End: 2025-04-04 | Stop reason: HOSPADM

## 2025-04-04 RX ORDER — SODIUM CHLORIDE 0.9 % (FLUSH) 0.9 %
3-10 SYRINGE (ML) INJECTION AS NEEDED
Status: DISCONTINUED | OUTPATIENT
Start: 2025-04-04 | End: 2025-04-04 | Stop reason: HOSPADM

## 2025-04-04 RX ORDER — LIDOCAINE HYDROCHLORIDE 10 MG/ML
0.5 INJECTION, SOLUTION INFILTRATION; PERINEURAL ONCE AS NEEDED
Status: DISCONTINUED | OUTPATIENT
Start: 2025-04-04 | End: 2025-04-04 | Stop reason: HOSPADM

## 2025-04-04 RX ORDER — BUPIVACAINE HYDROCHLORIDE AND EPINEPHRINE 5; 5 MG/ML; UG/ML
INJECTION, SOLUTION EPIDURAL; INTRACAUDAL; PERINEURAL AS NEEDED
Status: DISCONTINUED | OUTPATIENT
Start: 2025-04-04 | End: 2025-04-04 | Stop reason: HOSPADM

## 2025-04-04 RX ORDER — ROCURONIUM BROMIDE 10 MG/ML
INJECTION, SOLUTION INTRAVENOUS AS NEEDED
Status: DISCONTINUED | OUTPATIENT
Start: 2025-04-04 | End: 2025-04-04 | Stop reason: SURG

## 2025-04-04 RX ORDER — EPHEDRINE SULFATE 50 MG/ML
5 INJECTION, SOLUTION INTRAVENOUS ONCE AS NEEDED
Status: DISCONTINUED | OUTPATIENT
Start: 2025-04-04 | End: 2025-04-04 | Stop reason: HOSPADM

## 2025-04-04 RX ORDER — ONDANSETRON 2 MG/ML
INJECTION INTRAMUSCULAR; INTRAVENOUS AS NEEDED
Status: DISCONTINUED | OUTPATIENT
Start: 2025-04-04 | End: 2025-04-04 | Stop reason: SURG

## 2025-04-04 RX ORDER — FENTANYL CITRATE 50 UG/ML
50 INJECTION, SOLUTION INTRAMUSCULAR; INTRAVENOUS
Status: DISCONTINUED | OUTPATIENT
Start: 2025-04-04 | End: 2025-04-04 | Stop reason: HOSPADM

## 2025-04-04 RX ORDER — SUCCINYLCHOLINE CHLORIDE 20 MG/ML
INJECTION INTRAMUSCULAR; INTRAVENOUS AS NEEDED
Status: DISCONTINUED | OUTPATIENT
Start: 2025-04-04 | End: 2025-04-04 | Stop reason: SURG

## 2025-04-04 RX ADMIN — FENTANYL CITRATE 50 MCG: 50 INJECTION, SOLUTION INTRAMUSCULAR; INTRAVENOUS at 09:24

## 2025-04-04 RX ADMIN — PROPOFOL 200 MG: 10 INJECTION, EMULSION INTRAVENOUS at 08:59

## 2025-04-04 RX ADMIN — ROCURONIUM BROMIDE 10 MG: 10 INJECTION, SOLUTION INTRAVENOUS at 08:59

## 2025-04-04 RX ADMIN — LIDOCAINE HYDROCHLORIDE 60 MG: 20 INJECTION, SOLUTION INFILTRATION; PERINEURAL at 08:59

## 2025-04-04 RX ADMIN — FENTANYL CITRATE 50 MCG: 50 INJECTION, SOLUTION INTRAMUSCULAR; INTRAVENOUS at 10:01

## 2025-04-04 RX ADMIN — SUCCINYLCHOLINE CHLORIDE 180 MG: 20 INJECTION, SOLUTION INTRAMUSCULAR; INTRAVENOUS; PARENTERAL at 08:59

## 2025-04-04 RX ADMIN — CEFOXITIN SODIUM 2 G: 2 POWDER, FOR SOLUTION INTRAVENOUS at 08:49

## 2025-04-04 RX ADMIN — PROPOFOL 150 MCG/KG/MIN: 10 INJECTION, EMULSION INTRAVENOUS at 09:04

## 2025-04-04 RX ADMIN — SODIUM CHLORIDE, POTASSIUM CHLORIDE, SODIUM LACTATE AND CALCIUM CHLORIDE 9 ML/HR: 600; 310; 30; 20 INJECTION, SOLUTION INTRAVENOUS at 08:28

## 2025-04-04 RX ADMIN — ONDANSETRON 4 MG: 2 INJECTION, SOLUTION INTRAMUSCULAR; INTRAVENOUS at 09:58

## 2025-04-04 RX ADMIN — SUGAMMADEX 200 MG: 100 INJECTION, SOLUTION INTRAVENOUS at 10:10

## 2025-04-04 RX ADMIN — GLUCAGON 1 MG: KIT at 09:36

## 2025-04-04 RX ADMIN — DEXAMETHASONE SODIUM PHOSPHATE 8 MG: 4 INJECTION, SOLUTION INTRAMUSCULAR; INTRAVENOUS at 09:11

## 2025-04-04 RX ADMIN — DROPERIDOL 0.62 MG: 2.5 INJECTION, SOLUTION INTRAMUSCULAR; INTRAVENOUS at 10:26

## 2025-04-04 RX ADMIN — FENTANYL CITRATE 50 MCG: 50 INJECTION, SOLUTION INTRAMUSCULAR; INTRAVENOUS at 11:17

## 2025-04-04 RX ADMIN — ROCURONIUM BROMIDE 40 MG: 10 INJECTION, SOLUTION INTRAVENOUS at 09:09

## 2025-04-04 RX ADMIN — FAMOTIDINE 20 MG: 10 INJECTION, SOLUTION INTRAVENOUS at 08:34

## 2025-04-04 RX ADMIN — FENTANYL CITRATE 50 MCG: 50 INJECTION, SOLUTION INTRAMUSCULAR; INTRAVENOUS at 10:36

## 2025-04-04 NOTE — ANESTHESIA PROCEDURE NOTES
Airway  Reason: elective    Date/Time: 4/4/2025 9:02 AM  Airway not difficult    General Information and Staff    Patient location during procedure: OR  CRNA/CAA: Radha Espitia CRNA    Indications and Patient Condition  Indications for airway management: airway protection    Preoxygenated: yes    Mask difficulty assessment: 1 - vent by mask    Final Airway Details    Final airway type: endotracheal airway      Successful airway: ETT  Cuffed: yes   Successful intubation technique: direct laryngoscopy  Adjuncts used in placement: intubating stylet  Endotracheal tube insertion site: oral  Blade: Lo  Blade size: 3  ETT size (mm): 7.0  Cormack-Lehane Classification: grade I - full view of glottis  Placement verified by: chest auscultation and capnometry   Measured from: lips  ETT/EBT  to lips (cm): 22  Number of attempts at approach: 1  Assessment: lips, teeth, and gum same as pre-op and atraumatic intubation

## 2025-04-04 NOTE — DISCHARGE INSTRUCTIONS
POST OP RECOMMENDATIONS  Dr. Laureano Arita  [phone]  Discharge Instructions    Activity  No lifting more than 15 pounds for 4 weeks  Diet  As tolerated.  Avoid spicy and greasy foods for a few days.  Dressings  The glue on your incisions will fall off on its own in 1-2 weeks.  You do not need to pack any of your incisions  Bathing  It's ok to shower anytime.   Do not submerge your incisions (bath, pool, lake, ocean, etc.) for 3 weeks.  You can wash your incisions with warm soapy water very gently and pat dry  Pain Management  Unless you have been told otherwise, it's ok to take Tylenol 1000 mg every 6 hours as needed.  I have provided you a prescription for a narcotic pain medication. Take this as needed.   I have also provided you a muscle relaxer if you were still using it in the hospital. Take this scheduled for a couple days, and then you can transition to taking it only as needed.   Please call the office if you have intense pain that is not relieved.   Blood clot prevention  You should be up walking multiple times each day to prevent blood clots from forming.   Driving  Do not drive while taking narcotic pain medications.   Before driving, make sure that you are able to move and rotate appropriately to keep you and the rest of us safe on the road.   Follow up appointment  My office will call you with a follow up appointment if you do not have one already. It will be in 2 weeks.   If you do not hear from my office in 1 week, please call [phone] to ask about scheduling.   Remember to contact me for any of the following:   Fever > 101 degrees  Severe pain that cannot be controlled by taking your pain pills  Severe nausea or vomiting that cannot be controlled by taking your nausea pills  Significant bleeding of your incisions  Drainage that has a bad smell or is yellow or green in appearance  Any other questions or concerns

## 2025-04-04 NOTE — ANESTHESIA POSTPROCEDURE EVALUATION
Patient: Marina Mitchell    Procedure Summary       Date: 04/04/25 Room / Location: [facility] OR  / [facility] MAIN OR    Anesthesia Start: 0853 Anesthesia Stop: 1020    Procedure: CHOLECYSTECTOMY LAPAROSCOPIC INTRAOPERATIVE CHOLANGIOGRAM (Abdomen) Diagnosis:       Cholecystitis      Choledocholithiasis      (Cholecystitis [K81.9])      (Choledocholithiasis [K80.50])    Surgeons: Corona Arita MD Provider: Kobe Carrillo MD    Anesthesia Type: general ASA Status: 3            Anesthesia Type: general    Vitals  Vitals Value Taken Time   /71 04/04/25 11:30   Temp 36.7 °C (98 °F) 04/04/25 10:17   Pulse 82 04/04/25 11:40   Resp 20 04/04/25 10:30   SpO2 95 % 04/04/25 11:40   Vitals shown include unfiled device data.        Post Anesthesia Care and Evaluation    Patient location during evaluation: PACU  Patient participation: complete - patient participated  Level of consciousness: awake and alert  Pain management: adequate    Airway patency: patent  Anesthetic complications: No anesthetic complications  PONV Status: controlled  Cardiovascular status: acceptable and hemodynamically stable  Respiratory status: acceptable  Hydration status: acceptable    Comments: /61 (BP Location: Right arm, Patient Position: Sitting)   Pulse 92   Temp 36.7 °C (98 °F) (Oral)   Resp 17   LMP 01/27/2025 (Exact Date)   SpO2 98%

## 2025-04-04 NOTE — ANESTHESIA PREPROCEDURE EVALUATION
Anesthesia Evaluation     Patient summary reviewed and Nursing notes reviewed   history of anesthetic complications:  PONV               Airway   Mallampati: II  TM distance: >3 FB  Neck ROM: full  Dental - normal exam     Pulmonary - negative pulmonary ROS   Cardiovascular - negative cardio ROS        Neuro/Psych- negative ROS  GI/Hepatic/Renal/Endo    (+) morbid obesity    Musculoskeletal     Abdominal    Substance History      OB/GYN    (+) Pregnant        Other                    Anesthesia Plan    ASA 3     general   total IV anesthesia  (I have reviewed the patient's history with the patient and the chart, including all pertinent laboratory results and imaging. I have explained the risks of anesthesia including but not limited to dental damage, corneal abrasion, nerve injury, MI, stroke, and death. Questions asked and answered. Anesthetic plan discussed with patient and team as indicated. Patient expressed understanding of the above.    9-10 weeks pregnant; discussed risk related to GA to the fetus, will avoid versed  Recommend TIVA  Airway looks promising)  intravenous induction     Anesthetic plan, risks, benefits, and alternatives have been provided, discussed and informed consent has been obtained with: patient.    CODE STATUS:

## 2025-04-04 NOTE — OP NOTE
Colorectal & General Surgery  Operative Report    Patient: Marina Mitchell  YOB: 1995  MRN: [id number]  DATE OF PROCEDURE: 04/04/25     PREOPERATIVE DIAGNOSIS:  Calculus cholecystitis  Concern for transient episode of choledocholithiasis    POSTOPERATIVE DIAGNOSIS:  Acute on chronic calculus cholecystitis  Choledocholithiasis    PROCEDURE:  Laparoscopic cholecystectomy with intraoperative cholangiogram    FINDINGS:  Critical view of safety achieved prior to performing the cholangiogram.  Cholangiogram demonstrated flow of contrast to the cystic duct, the common hepatic duct, the left and right hepatic ducts, common bile duct but not into the duodenum.  There appeared to be a filling defect at the ampulla consistent with impacted stone.  Glucagon was administered and the duct was flushed with 300 cc of normal saline.  A completion cholangiogram was then performed that demonstrated free flow of contrast into the duodenum with no filling defects within the common bile duct.    SURGEON:  Laureano Arita MD    ASSISTANT:  Assistant: Molly Brink PA-C was responsible for performing the following activities: Retraction, Suturing, Closing, Placing Dressing, and Held/Positioned Camera and their skilled assistance was necessary for the success of this case.      ANESTHESIA:  General endotracheal    EBL:  5 mL    SPECIMEN:  Gallbladder    OPERATIVE DESCRIPTION:  The patient was brought to the operating room under the care of the nursing staff.  The patient was placed on the operating room table in the supine position where anesthesia was induced.  The patient was then prepped and positioned in the usual sterile fashion.  A standardized timeout was then performed.    The Veress needle was inserted into the right upper quadrant atraumatically.  The abdomen was insufflated to 15 mmHg.  Local anesthetic was infiltrated into all incision sites.  A 5 mm trocar was placed at the umbilicus.  3 additional trocars were  placed in the right upper quadrant, the subxiphoid port being 10 mm.  The Veress needle site was inspected.  The gallbladder was identified and attachments to the omentum and duodenum were taken down sharply.  The peritoneum on either side of the gallbladder was incised.  Combination of blunt and sharp dissection was utilized to identify the cystic duct and cystic artery.  Critical view of safety was achieved with 2 and only 2 structures entering the gallbladder.  A clip was placed at the junction of the gallbladder and the cystic duct.  An incision was then made on the cystic duct and a cholangiogram catheter was placed and secured in position.  A cholangiogram was then performed as described above.  The clip was then removed and the cholangiogram catheter was removed.  The cystic duct had 2 additional clips placed and was divided.  The cystic artery was then clipped, with 1 clip distally and 2 clips proximally.  It was also divided.  The gallbladder was then removed from the liver with the Bovie electrocautery.  The gallbladder was placed in an Endo Catch bag.  Hemostasis was verified.  Clip placement was verified.  The gallbladder was then removed through the subxiphoid port, which was closed with 0 Vicryl suture.  The remaining trocars were removed and the abdomen was desufflated.  All incisions were copiously irrigated.  The incisions were closed with 3-0 Monocryl suture and Exofin.    All needle, sponge, and instrument counts were correct at the end of the case.    The patient tolerated the procedure well and was transferred to the postanesthesia care unit in stable condition.    Laureano Arita M.D.  Colorectal & General Surgery  [facility]    [address] [place] [address] [address]  [place], [place], [address]  P: [phone]  F: [phone]

## 2025-04-05 LAB
CYTO UR: NORMAL
LAB AP CASE REPORT: NORMAL
PATH REPORT.FINAL DX SPEC: NORMAL
PATH REPORT.GROSS SPEC: NORMAL

## 2025-04-07 ENCOUNTER — TELEPHONE (OUTPATIENT)
Dept: SURGERY | Facility: CLINIC | Age: 30
End: 2025-04-07
Payer: COMMERCIAL

## 2025-04-07 NOTE — TELEPHONE ENCOUNTER
"Called pt and relayed Dr. Goldberg's message \" Please let her know pathology was benign and showed chronic inflammation of the gallbladder\". Pt verbalized understanding.   "

## 2025-04-08 ENCOUNTER — INITIAL PRENATAL (OUTPATIENT)
Dept: OBSTETRICS AND GYNECOLOGY | Facility: CLINIC | Age: 30
End: 2025-04-08
Payer: COMMERCIAL

## 2025-04-08 VITALS — DIASTOLIC BLOOD PRESSURE: 86 MMHG | SYSTOLIC BLOOD PRESSURE: 134 MMHG | BODY MASS INDEX: 47.26 KG/M2 | WEIGHT: 266.8 LBS

## 2025-04-08 DIAGNOSIS — Z11.51 SCREENING FOR HUMAN PAPILLOMAVIRUS (HPV): ICD-10-CM

## 2025-04-08 DIAGNOSIS — Z34.00 INITIAL OBSTETRIC VISIT, ANTEPARTUM: Primary | ICD-10-CM

## 2025-04-08 DIAGNOSIS — Z01.419 CERVICAL SMEAR, AS PART OF ROUTINE GYNECOLOGICAL EXAMINATION: ICD-10-CM

## 2025-04-08 DIAGNOSIS — O99.210 OBESITY AFFECTING PREGNANCY, ANTEPARTUM, UNSPECIFIED OBESITY TYPE: ICD-10-CM

## 2025-04-08 DIAGNOSIS — Z01.419 ROUTINE GYNECOLOGICAL EXAMINATION: ICD-10-CM

## 2025-04-08 DIAGNOSIS — Z36.9 ENCOUNTER FOR ANTENATAL SCREENING, UNSPECIFIED: ICD-10-CM

## 2025-04-08 DIAGNOSIS — O21.9 NAUSEA AND VOMITING DURING PREGNANCY PRIOR TO 22 WEEKS GESTATION: ICD-10-CM

## 2025-04-08 LAB
BILIRUB BLD-MCNC: NEGATIVE MG/DL
CLARITY, POC: CLEAR
COLOR UR: YELLOW
GLUCOSE UR STRIP-MCNC: NEGATIVE MG/DL
KETONES UR QL: ABNORMAL
LEUKOCYTE EST, POC: NEGATIVE
NITRITE UR-MCNC: NEGATIVE MG/ML
PH UR: 5 [PH] (ref 5–8)
PROT UR STRIP-MCNC: ABNORMAL MG/DL
RBC # UR STRIP: NEGATIVE /UL
SP GR UR: 1 (ref 1–1.03)
UROBILINOGEN UR QL: NORMAL

## 2025-04-08 NOTE — TELEPHONE ENCOUNTER
"Called pt and released Dr. Bernal's message that it is not necessary to see him, Only if she is having issues. Otherwise, she should follow up with Dr Arita in 2-3 weeks\". Pt says she has appointment with Dr. Arita on 04/16/25 already.   "

## 2025-04-08 NOTE — PROGRESS NOTES
Initial OB Visit     Chief Complaint   Patient presents with    Initial Prenatal Visit       Marina Mitchell is being seen today for her first obstetrical visit.  She is a 29 y.o.    10w1d gestation. This problem is new to me: yes      OB History          2    Para        Term                AB   1    Living             SAB        IAB        Ectopic        Molar        Multiple        Live Births                    LNMP: 25  Confident with date: Yes  Taking prenatal vitamins: Yes  Planned pregnancy: Yes  Prior obstetric issues, potential pregnancy concerns: h/o SAB, gallstones, fatty liver  Family history of genetic issues (includes FOB): no  Prior infections concerning in pregnancy (Rash, fever in last 2 weeks): no  Varicella Hx: yes  Flu vaccine: no  COVID Vaccine: yes, no booster  History of STDs: no  Current medications: PN, Unisom/B6, Zofran PRN, Pepcid, Carafate PRN, Oxy PRN,  Last pap smear: 2023  Smoker: No  Drug or alcohol abuse: No  H/O Physical, mental or sexual abuse: no  H/O mental health disorder: no  Prior testing for Cystic Fibrosis Carrier or Sickle Cell Trait- no  Prepregnancy BMI: Body mass index is 47.26 kg/m².      Past Medical History:   Diagnosis Date    Fatty liver     Gallstones     PONV (postoperative nausea and vomiting)        Past Surgical History:   Procedure Laterality Date    CHOLECYSTECTOMY WITH INTRAOPERATIVE CHOLANGIOGRAM N/A 2025    Procedure: CHOLECYSTECTOMY LAPAROSCOPIC INTRAOPERATIVE CHOLANGIOGRAM;  Surgeon: Corona Arita MD;  Location: [facility];  Service: General;  Laterality: N/A;    CLAVICLE SURGERY Right 10/31/2019         Current Outpatient Medications:     doxylamine (Unisom SleepTabs) 25 MG tablet, Take 1 tablet by mouth Every Night., Disp: 30 tablet, Rfl: 1    famotidine (Pepcid) 20 MG tablet, Take 1 tablet by mouth 2 (Two) Times a Day., Disp: 60 tablet, Rfl: 1    ondansetron ODT (ZOFRAN-ODT) 4 MG disintegrating tablet, Take  1 tablet by mouth Every 8 (Eight) Hours As Needed for Vomiting or Nausea., Disp: 30 tablet, Rfl: 1    oxyCODONE (Roxicodone) 5 MG immediate release tablet, Take 1 tablet by mouth Every 4 (Four) Hours As Needed for Moderate Pain or Severe Pain for up to 7 days., Disp: 12 tablet, Rfl: 0    prenatal vitamin (prenatal, CLASSIC, vitamin) tablet, Take 1 tablet by mouth Daily., Disp: , Rfl:     sucralfate (CARAFATE) 1 GM/10ML suspension, Take 10 mL by mouth 4 (Four) Times a Day., Disp: 420 mL, Rfl: 0    vitamin B-6 (PYRIDOXINE) 25 MG tablet, Take 1 tablet by mouth Every Night., Disp: 30 tablet, Rfl: 1    No Known Allergies    Social History     Socioeconomic History    Marital status: Single   Tobacco Use    Smoking status: Never     Passive exposure: Never    Smokeless tobacco: Never   Vaping Use    Vaping status: Never Used   Substance and Sexual Activity    Alcohol use: Not Currently     Comment: Maybe when or to dinner or special occasion    Drug use: Never    Sexual activity: Yes     Partners: Male     Birth control/protection: None       Family History   Problem Relation Age of Onset    Lung cancer Mother     Lupus Mother     Drug abuse Mother     Prostate cancer Father     Seizures Father     Cervical cancer Maternal Grandmother         Cervical    Heart failure Paternal Grandmother     No Known Problems Half-Brother     No Known Problems Half-Sister     Malig Hyperthermia Neg Hx        Review of systems     All other systems reviewed and are negative except for: Gastrointestinal: positive for nausea- much improved     Objective    /86   Wt 121 kg (266 lb 12.8 oz)   LMP 01/27/2025 (Exact Date)   BMI 47.26 kg/m²     General Appearance:    Alert, cooperative, in no acute distress, habitus obese   Head:    Normocephalic, without obvious abnormality, atraumatic   Neck:   supple   Breast Exam:    Deferred   Abdomen:     Surgical incision x 4 healing well with no s/s infection; dermatitis noted to upper right    Genitalia:    Vulva - BUS-WNL, NEFG    Vagina - No discharge, No bleeding    Cervix - No Lesions, closed     Uterus - Consistent with 10 weeks    Adnexa - No mass, NT    Pelvimetry - clinically adequate, gynecoid pelvis     Extremities:   Moves all extremities well, no edema, no cyanosis, no     redness   Skin:   No bleeding, bruising or rash   Neurologic:   Sensation intact, A&O times 3         Assessment/Plan    1) Pregnancy at 10w1d- US today: FHR 164bpm. OV WNL. No ARLEEN seen. US findings discussed with pt. EDC established 11/3/2025 and confirmed by LNMP/US.     2) OB exam: OB exam completed: Yes. New OB bag provided Yes. Pap collected: Yes. Provided list of safe medications to take while in pregnancy.    3) Labs: OB labs collected: Yes. Counseled on genetic carrier screening (CF/SMA/FX): Yes; she desires.  Counseled on NIPS: Yes, she desires today. Counseled on Quad/AFP: Yes, she desires at her next OB appt.    4)  Body mass index is 47.26 kg/m². Obese women with a pre-pregnancy BMI of 30+ should strive to gain approx 11-20 pounds for the entire pregnancy.  Rec baby ASA @ 12 weeks. Growth US every 4 weeks. ANT @ 34 weeks.      5)  Prenatal care: Oriented to the office and prenatal care. Encourage prenatal vitamins. Disc Tylenol products are fine, avoid aspirin and ibuprofen; Zika (travel restrictions/ok to use insect repellant); not to change cat litter; food restrictions; exercise; avoidance of alcohol, tobacco, drugs and saunas/hot tubs.     6) S/p Covid vaccine: yes, no booster; S/p Flu vaccine: no    7)  Cholelithiasis without evidence of cholecystitis- S/P hospital admission for (R)UQ pain.  S/p cholecystectomy on 4/4/25.  Doing well with no concerns.     8)  ? PCOS diagnosis- Had polycystic appearing ovaries on US. Normal labs and h/o normal cycles. She did have a few abnormal cycles following her miscarriage 5/24.       9) Fatty liver- This is not a new diagnosis. She has never seen GI. Ref placed.       10)  Nausea- much improved since recent surgery.  Enc small frequent meals.  Has Unisom/B6 and Zofran PRN     11) ARLEEN- compared to previous US on 3/17/2025.  No ARLEEN seen on US today.       All questions answered.     RTO 5 weeks OBT, AFP    Parts of this document have been copied or forwarded from her previous visits and have been reviewed, updated and edited as indicated.      Estelle Brown, APRN    4/8/2025  10:34 EDT

## 2025-04-09 ENCOUNTER — RESULTS FOLLOW-UP (OUTPATIENT)
Dept: OBSTETRICS AND GYNECOLOGY | Facility: CLINIC | Age: 30
End: 2025-04-09
Payer: COMMERCIAL

## 2025-04-09 DIAGNOSIS — O23.40 URINARY TRACT INFECTION IN MOTHER DURING PREGNANCY, ANTEPARTUM: Primary | ICD-10-CM

## 2025-04-09 LAB
ABO GROUP BLD: NORMAL
BASOPHILS # BLD AUTO: 0.1 X10E3/UL (ref 0–0.2)
BASOPHILS NFR BLD AUTO: 1 %
BLD GP AB SCN SERPL QL: NEGATIVE
EOSINOPHIL # BLD AUTO: 0.2 X10E3/UL (ref 0–0.4)
EOSINOPHIL NFR BLD AUTO: 2 %
ERYTHROCYTE [DISTWIDTH] IN BLOOD BY AUTOMATED COUNT: 12.4 % (ref 11.7–15.4)
HBA1C MFR BLD: 5.5 % (ref 4.8–5.6)
HBV SURFACE AG SERPL QL IA: NEGATIVE
HCT VFR BLD AUTO: 41.5 % (ref 34–46.6)
HCV IGG SERPL QL IA: NON REACTIVE
HGB A MFR BLD ELPH: 97.4 % (ref 96.4–98.8)
HGB A2 MFR BLD ELPH: 2.6 % (ref 1.8–3.2)
HGB BLD-MCNC: 13.6 G/DL (ref 11.1–15.9)
HGB F MFR BLD ELPH: 0 % (ref 0–2)
HGB FRACT BLD-IMP: NORMAL
HGB S MFR BLD ELPH: 0 %
HIV 1+2 AB+HIV1 P24 AG SERPL QL IA: NON REACTIVE
IMM GRANULOCYTES # BLD AUTO: 0.1 X10E3/UL (ref 0–0.1)
IMM GRANULOCYTES NFR BLD AUTO: 1 %
LYMPHOCYTES # BLD AUTO: 3.7 X10E3/UL (ref 0.7–3.1)
LYMPHOCYTES NFR BLD AUTO: 27 %
MCH RBC QN AUTO: 30 PG (ref 26.6–33)
MCHC RBC AUTO-ENTMCNC: 32.8 G/DL (ref 31.5–35.7)
MCV RBC AUTO: 91 FL (ref 79–97)
MONOCYTES # BLD AUTO: 0.6 X10E3/UL (ref 0.1–0.9)
MONOCYTES NFR BLD AUTO: 4 %
NEUTROPHILS # BLD AUTO: 8.9 X10E3/UL (ref 1.4–7)
NEUTROPHILS NFR BLD AUTO: 65 %
PLATELET # BLD AUTO: 354 X10E3/UL (ref 150–450)
RBC # BLD AUTO: 4.54 X10E6/UL (ref 3.77–5.28)
RH BLD: POSITIVE
RPR SER QL: NON REACTIVE
RUBV IGG SERPL IA-ACNC: 2.17 INDEX
VZV IGG SER QL IA: REACTIVE
WBC # BLD AUTO: 13.6 X10E3/UL (ref 3.4–10.8)

## 2025-04-09 NOTE — PROGRESS NOTES
PIP OB labs - Blood Type is A+.  Hep. B & C are neg; HIV & syphilis are neg.  Immune to rubella and chicken pox. HgbA1C is normal. Other labs are pending.

## 2025-04-11 LAB
AMPHETAMINES UR QL SCN: NEGATIVE NG/ML
BACTERIA UR CULT: ABNORMAL
BARBITURATES UR QL SCN: NEGATIVE NG/ML
BENZODIAZ UR QL SCN: NEGATIVE NG/ML
BUPRENORPHINE UR QL: NEGATIVE NG/ML
BZE UR QL SCN: NEGATIVE NG/ML
C TRACH RRNA CVX QL NAA+PROBE: NEGATIVE
CANNABINOIDS UR QL SCN: NEGATIVE NG/ML
CONV .: NORMAL
CREAT UR-MCNC: 85.6 MG/DL (ref 20–300)
CYTOLOGIST CVX/VAG CYTO: NORMAL
CYTOLOGY CVX/VAG DOC CYTO: NORMAL
CYTOLOGY CVX/VAG DOC THIN PREP: NORMAL
DX ICD CODE: NORMAL
FENTANYL UR-MCNC: NEGATIVE PG/ML
LABORATORY COMMENT REPORT: ABNORMAL
Lab: NORMAL
MEPERIDINE UR QL: NEGATIVE NG/ML
METHADONE UR QL SCN: NEGATIVE NG/ML
N GONORRHOEA RRNA CVX QL NAA+PROBE: NEGATIVE
OPIATES UR QL SCN: NEGATIVE NG/ML
OTHER ANTIBIOTIC SUSC ISLT: ABNORMAL
OTHER STN SPEC: NORMAL
OXYCODONE+OXYMORPHONE UR QL SCN: POSITIVE NG/ML
PCP UR QL: NEGATIVE NG/ML
PH UR: 6.4 [PH] (ref 4.5–8.9)
PROPOXYPH UR QL SCN: NEGATIVE NG/ML
SERVICE CMNT-IMP: NORMAL
STAT OF ADQ CVX/VAG CYTO-IMP: NORMAL
T VAGINALIS RRNA SPEC QL NAA+PROBE: NEGATIVE
TRAMADOL UR QL SCN: NEGATIVE NG/ML

## 2025-04-12 LAB
CFDNA.FET/CFDNA.TOTAL SFR FETUS: NORMAL %
CITATION REF LAB TEST: NORMAL
FET 13+18+21+X+Y ANEUP PLAS.CFDNA: NEGATIVE
FET CHR 21 TS PLAS.CFDNA QL: NEGATIVE
FET SEX PLAS.CFDNA DOSAGE CFDNA: NORMAL
FET TS 13 RISK PLAS.CFDNA QL: NEGATIVE
FET TS 18 RISK WBC.DNA+CFDNA QL: NEGATIVE
GA EST FROM CONCEPTION DATE: NORMAL D
GESTATIONAL AGE > 9:: YES
LAB DIRECTOR NAME PROVIDER: NORMAL
LAB DIRECTOR NAME PROVIDER: NORMAL
LABORATORY COMMENT REPORT: NORMAL
LIMITATIONS OF THE TEST: NORMAL
NEGATIVE PREDICTIVE VALUE: NORMAL
PERFORMANCE CHARACTERISTICS: NORMAL
POSITIVE PREDICTIVE VALUE: NORMAL
REF LAB TEST METHOD: NORMAL
SERVICE CMNT-IMP: NORMAL
TEST PERFORMANCE INFO SPEC: NORMAL

## 2025-04-14 DIAGNOSIS — O23.40 URINARY TRACT INFECTION IN MOTHER DURING PREGNANCY, ANTEPARTUM: Primary | ICD-10-CM

## 2025-04-14 RX ORDER — NITROFURANTOIN 25; 75 MG/1; MG/1
100 CAPSULE ORAL 2 TIMES DAILY
Qty: 14 CAPSULE | Refills: 0 | Status: CANCELLED | OUTPATIENT
Start: 2025-04-14 | End: 2025-04-21

## 2025-04-14 RX ORDER — NITROFURANTOIN 25; 75 MG/1; MG/1
100 CAPSULE ORAL 2 TIMES DAILY
Qty: 14 CAPSULE | Refills: 0 | Status: SHIPPED | OUTPATIENT
Start: 2025-04-14 | End: 2025-04-21

## 2025-04-14 NOTE — PROGRESS NOTES
PIP UDS + for oxycodone- recently had surgery.  Urine cx + for UTI- ERX Macrobid.  MT21 neg; gender- male

## 2025-04-16 ENCOUNTER — OFFICE VISIT (OUTPATIENT)
Dept: SURGERY | Facility: CLINIC | Age: 30
End: 2025-04-16
Payer: COMMERCIAL

## 2025-04-16 VITALS — WEIGHT: 261 LBS | HEIGHT: 63 IN | BODY MASS INDEX: 46.25 KG/M2

## 2025-04-16 DIAGNOSIS — K80.50 CHOLEDOCHOLITHIASIS: Primary | ICD-10-CM

## 2025-04-16 PROBLEM — K81.9 CHOLECYSTITIS: Status: RESOLVED | Noted: 2025-04-02 | Resolved: 2025-04-16

## 2025-04-16 PROBLEM — R10.9 ABDOMINAL PAIN: Status: RESOLVED | Noted: 2025-03-03 | Resolved: 2025-04-16

## 2025-04-16 PROCEDURE — 99024 POSTOP FOLLOW-UP VISIT: CPT | Performed by: SURGERY

## 2025-04-16 NOTE — PROGRESS NOTES
Colorectal & General Surgery  Post - Op    Patient: Marina Mitchell  YOB: 1995  MRN: [id number]      Assessment  Marina Mitchell is a 29 y.o. female who is now 11 weeks and 2 days gestation who recently underwent laparoscopic cholecystectomy for what I suspect was episodes of choledocholithiasis with chronic calculus cholecystitis.  She is recovering very well with no further pain.  Incisions are in good order.  Recently saw her OB team and everything appears to be going well with her pregnancy.    She is welcome to follow-up with me on an as-needed basis.      History of Present Illness   Marina Mitchell is a 29 y.o. female who presents in postoperative follow-up with no complaints today.    Vital Signs  There were no vitals filed for this visit.     Physical Exam  Constitutional: Resting comfortably, no acute distress  Neck: Supple, trachea midline  Respiratory: No increased work of breathing, Symmetric excursion  Cardiovascular: Well pefursed, no jugular venous distention evident   Abdominal: Incisions in good order.  Soft, non-tender, non-distended  Lymphatics: No cervical or suprascapular adenopathy  Skin: Warm, dry, no rash on visualized skin surfaces  Musculoskeletal: Symmetric strength, no obvious gross abnormalities  Psychiatric: Alert and oriented ×3, normal affect             Pathology  I have personally reviewed pathology results with the patient demonstrating chronic calculus cholecystitis and cholesterolosis.    Laureano Arita MD  Colorectal & General Surgery  [facility]    [facility], [address]  [place], [place], [address]  P: [phone]  F: [phone]

## 2025-04-23 LAB
CITATION REF LAB TEST: ABNORMAL
CLINICAL INFO: ABNORMAL
GENDER IDENTITY: ABNORMAL
GENE DIS ANL CARRIER INTERP-IMP: ABNORMAL
GENE STUDIED ID: ABNORMAL
GENETIC SCN SPEC: ABNORMAL
LAB DIRECTOR NAME PROVIDER: ABNORMAL
Lab: ABNORMAL
REASON FOR REFERRAL (NARRATIVE): ABNORMAL
RECOMMENDATION PATIENT DOC-IMP: ABNORMAL
REF LAB TEST METHOD: ABNORMAL
SERVICE CMNT-IMP: ABNORMAL
SPECIMEN SOURCE: ABNORMAL

## 2025-04-24 PROBLEM — Z14.1 CYSTIC FIBROSIS CARRIER: Status: ACTIVE | Noted: 2025-04-24

## 2025-05-13 ENCOUNTER — ROUTINE PRENATAL (OUTPATIENT)
Dept: OBSTETRICS AND GYNECOLOGY | Facility: CLINIC | Age: 30
End: 2025-05-13
Payer: COMMERCIAL

## 2025-05-13 VITALS — SYSTOLIC BLOOD PRESSURE: 128 MMHG | DIASTOLIC BLOOD PRESSURE: 86 MMHG | WEIGHT: 269 LBS | BODY MASS INDEX: 47.65 KG/M2

## 2025-05-13 DIAGNOSIS — Z34.92 NORMAL PREGNANCY IN SECOND TRIMESTER: ICD-10-CM

## 2025-05-13 DIAGNOSIS — Z36.9 ENCOUNTER FOR ANTENATAL SCREENING, UNSPECIFIED: Primary | ICD-10-CM

## 2025-05-13 DIAGNOSIS — Z14.1 CYSTIC FIBROSIS CARRIER: ICD-10-CM

## 2025-05-13 LAB
BILIRUB BLD-MCNC: NEGATIVE MG/DL
CLARITY, POC: CLEAR
COLOR UR: YELLOW
GLUCOSE UR STRIP-MCNC: NEGATIVE MG/DL
KETONES UR QL: NEGATIVE
LEUKOCYTE EST, POC: NEGATIVE
NITRITE UR-MCNC: NEGATIVE MG/ML
PH UR: 5 [PH] (ref 5–8)
PROT UR STRIP-MCNC: NEGATIVE MG/DL
RBC # UR STRIP: NEGATIVE /UL
SP GR UR: 1 (ref 1–1.03)
UROBILINOGEN UR QL: NORMAL

## 2025-05-13 NOTE — PROGRESS NOTES
OB follow up     Marina Mitchell is a 29 y.o.  15w1d being seen today for her obstetrical visit.  Patient reports no bleeding, no contractions, and no leaking. Fetal movement: normal.  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  FOB needs testing today.  Fires AFP.    Review of Systems  No bleeding, No cramping/contractions     /86   Wt 122 kg (269 lb)   LMP 2025 (Exact Date)   BMI 47.65 kg/m²     FHT: present BPM   Uterine Size:     AFP and genetic testing on father of baby drawn today.    Assessment/Plan:    1) 29 y.o.  -pregnancy at 15w1d    2)   Encounter Diagnoses   Name Primary?    Encounter for  screening, unspecified Yes    Cystic fibrosis carrier- FOB needs testing     Normal pregnancy in second trimester    Ultrasound for anatomy next visit.    3) Reviewed this stage of pregnancy  4) Problem list updated     Return in about 4 weeks (around 6/10/2025) for US, Anatomy, OB Tummy.    I spent 20 minutes caring for Marina on this date of service. This time includes time spent by me in the following activities: preparing for the visit, reviewing tests, performing a medically appropriate examination and/or evaluation, counseling and educating the patient/family/caregiver, independently interpreting results and communicating that information with the patient/family/caregiver, and ordering test(s).      Rosendo Gr MD    2025  16:43 EDT

## 2025-05-15 LAB
AFP INTERP SERPL-IMP: NORMAL
AFP INTERP SERPL-IMP: NORMAL
AFP MOM SERPL: 0.95
AFP SERPL-MCNC: 19.8 NG/ML
AGE AT DELIVERY: 29.1 YR
GA METHOD: NORMAL
GA: 15.1 WEEKS
IDDM PATIENT QL: NO
LABORATORY COMMENT REPORT: NORMAL
MULTIPLE PREGNANCY: NO
NEURAL TUBE DEFECT RISK FETUS: NORMAL %
RESULT: NORMAL

## 2025-06-17 ENCOUNTER — ROUTINE PRENATAL (OUTPATIENT)
Dept: OBSTETRICS AND GYNECOLOGY | Facility: CLINIC | Age: 30
End: 2025-06-17
Payer: COMMERCIAL

## 2025-06-17 VITALS — SYSTOLIC BLOOD PRESSURE: 132 MMHG | WEIGHT: 277 LBS | DIASTOLIC BLOOD PRESSURE: 84 MMHG | BODY MASS INDEX: 49.07 KG/M2

## 2025-06-17 DIAGNOSIS — E28.2 PCOS (POLYCYSTIC OVARIAN SYNDROME): ICD-10-CM

## 2025-06-17 DIAGNOSIS — K76.0 FATTY INFILTRATION OF LIVER: ICD-10-CM

## 2025-06-17 DIAGNOSIS — O99.210 OBESITY AFFECTING PREGNANCY, ANTEPARTUM, UNSPECIFIED OBESITY TYPE: ICD-10-CM

## 2025-06-17 DIAGNOSIS — Z90.49 HX OF CHOLECYSTECTOMY: ICD-10-CM

## 2025-06-17 DIAGNOSIS — Z3A.20 20 WEEKS GESTATION OF PREGNANCY: ICD-10-CM

## 2025-06-17 DIAGNOSIS — Z36.9 ENCOUNTER FOR ANTENATAL SCREENING, UNSPECIFIED: ICD-10-CM

## 2025-06-17 DIAGNOSIS — O44.40 LOW-LYING PLACENTA: ICD-10-CM

## 2025-06-17 DIAGNOSIS — Z14.1 CYSTIC FIBROSIS CARRIER: Primary | ICD-10-CM

## 2025-06-17 DIAGNOSIS — O23.40 URINARY TRACT INFECTION IN MOTHER DURING PREGNANCY, ANTEPARTUM: ICD-10-CM

## 2025-06-17 PROBLEM — Z34.90 PREGNANCY: Status: ACTIVE | Noted: 2025-06-17

## 2025-06-17 PROBLEM — O02.1 MISSED ABORTION: Status: RESOLVED | Noted: 2024-05-23 | Resolved: 2025-06-17

## 2025-06-17 LAB
BILIRUB BLD-MCNC: NEGATIVE MG/DL
CLARITY, POC: CLEAR
COLOR UR: YELLOW
GLUCOSE UR STRIP-MCNC: NEGATIVE MG/DL
KETONES UR QL: NEGATIVE
LEUKOCYTE EST, POC: NEGATIVE
NITRITE UR-MCNC: NEGATIVE MG/ML
PH UR: 6 [PH] (ref 5–8)
PROT UR STRIP-MCNC: NEGATIVE MG/DL
RBC # UR STRIP: NEGATIVE /UL
SP GR UR: 1.03 (ref 1–1.03)
UROBILINOGEN UR QL: NORMAL

## 2025-06-17 NOTE — PROGRESS NOTES
Ob follow up      Marina Mitchell is a 29 y.o.  20w1d patient being seen today for her obstetrical visit. Patient reports carpal tunnel bilaterally . Fetal movement: normal.      ROS - Denies leaking fluid, vaginal bleeding and notes good fetal movement.     /84   Wt 126 kg (277 lb)   LMP 2025 (Exact Date)   BMI 49.07 kg/m²       Vitals: VSS; AF    General Appearance:  Awake. Alert. Well developed. Well nourished. In no acute distress.    Visual Inspection: ° Abdomen was normal on visual inspection.  Palpation: ° Abdomen was soft. ° Abdominal non-tender.    Uterus: ° Fundal height was normal for gestational age. ° Not tender.  Uterine Adnexae: ° Normal without masses or tenderness.  Neurological:  ° Oriented to time, place, and person.  Skin:  ° General appearance was normal. No bruising or ecchymosis.  Obstetrical: +FM and FCA     A/P      Diagnoses and all orders for this visit:    1. Cystic fibrosis carrier- FOB needs testing (Primary)    2. PCOS (polycystic ovarian syndrome)  Overview:  A1C WNL   2hr gtt 24-28 wga ( )       3. Urinary tract infection in mother during pregnancy, antepartum    4. Obesity affecting pregnancy, antepartum, unspecified obesity type  Overview:  Body mass index is 47.26 kg/m².   ASA @ 12 weeks.   Growth US every 4 weeks.   28 wga ( )   32 ( )   36 ( )     BPP @ 34 weeks.       5. Hx of cholecystectomy  Overview:  This pregnancy; First trimester   Healed       6. Fatty infiltration of liver    7. Encounter for  screening, unspecified  -     POC Urinalysis Dipstick    8. 20 weeks gestation of pregnancy  Overview:  CF carrier   SMN1, FX neg   AFP low risk   NIPT low risk       9. Low-lying placenta  Overview:  Pelvic rest   Re-screen 4 weeks       F/u 4 weeks   OB, US ( low lying )       I confirm that all copied/forwarded documentation in this record has been carefully reviewed, updated as necessary, and accurately reflects the patient's current status and plan  of care.        Graham Gonzales DO     6/17/2025  10:41 EDT

## 2025-07-13 PROBLEM — Z14.8 GENETIC CARRIER STATUS: Status: ACTIVE | Noted: 2025-07-13

## 2025-07-13 PROBLEM — Z23 NEED FOR VACCINATION: Status: ACTIVE | Noted: 2025-07-13

## 2025-07-13 NOTE — PROGRESS NOTES
Chief Complaint   Patient presents with    Routine Prenatal Visit       HPI: 29 y.o.  at 23w6d here for ob check and u/s for LLT    Relevant data reviewed:    Vitals:    07/15/25 1420   BP: 114/72   Weight: 127 kg (280 lb)     Total weight gain for pregnancy:  Not found.    Cx exam:   / /        Review of systems:   Gen: negative  CV:     negative  GI: negative  :   negative  MS:    negative  Neuro: negative  Pul: negative    Physical Exam  Vitals and nursing note reviewed.   Constitutional:       Appearance: She is well-developed.   HENT:      Head: Normocephalic and atraumatic.   Cardiovascular:      Rate and Rhythm: Normal rate.   Pulmonary:      Effort: Pulmonary effort is normal.   Abdominal:      General: There is no distension.      Palpations: Abdomen is soft. There is no mass.      Tenderness: There is no abdominal tenderness. There is no guarding.   Genitourinary:     Vagina: No vaginal discharge.   Musculoskeletal:         General: No tenderness or deformity. Normal range of motion.      Cervical back: Normal range of motion.   Skin:     General: Skin is warm and dry.      Coloration: Skin is not pale.      Findings: No erythema or rash.   Neurological:      Mental Status: She is alert and oriented to person, place, and time.   Psychiatric:         Behavior: Behavior normal.         Thought Content: Thought content normal.         Judgment: Judgment normal.             Results for orders placed in visit on 07/15/25    US OB Follow Up Transabdominal Approach    Narrative  US IMP:    BREECH  EFW = 40%  DVP = 5CM  CL = 4.32  PLAC POST, LLT 0.92CM FROM INT OS    Samuel Prince MD       A/P  1. Intrauterine pregnancy at 23w6d   2. Pregnancy Risk:  COMPLICATED    Diagnoses and all orders for this visit:    1. Encounter for  screening, unspecified (Primary)  -     POC Urinalysis Dipstick    2. Cystic fibrosis carrier- FOB needs testing    3. Metabolic syndrome X    4. PCOS  (polycystic ovarian syndrome)  Overview:  A1C WNL   2hr gtt 24-28 wga ( )       5. Fatty infiltration of liver    6. Hx of cholecystectomy  Overview:  This pregnancy; First trimester   Healed       7. Obesity affecting pregnancy, antepartum, unspecified obesity type  Overview:  Body mass index is 47.26 kg/m².   ASA @ 12 weeks.   Growth US every 4 weeks.   28 wga ( )   32 ( )   36 ( )     BPP @ 34 weeks.       8. 24 weeks gestation of pregnancy  Overview:  CF carrier   SMN1, FX neg   AFP low risk   NIPT low risk       9. Low-lying placenta  Overview:  Pelvic rest   Re-screen 4 weeks           Practice OB call structure was discussed.   -----------------------  PLAN:   Return in about 4 weeks (around 8/12/2025) for ob tummy, GTT/HH, Tdap. & u/s for LLT, BMI w/ growth      Samuel Prince MD  7/15/2025 17:08 EDT

## 2025-07-15 ENCOUNTER — ROUTINE PRENATAL (OUTPATIENT)
Dept: OBSTETRICS AND GYNECOLOGY | Facility: CLINIC | Age: 30
End: 2025-07-15
Payer: COMMERCIAL

## 2025-07-15 VITALS — WEIGHT: 280 LBS | SYSTOLIC BLOOD PRESSURE: 114 MMHG | BODY MASS INDEX: 49.6 KG/M2 | DIASTOLIC BLOOD PRESSURE: 72 MMHG

## 2025-07-15 DIAGNOSIS — O99.210 OBESITY AFFECTING PREGNANCY, ANTEPARTUM, UNSPECIFIED OBESITY TYPE: ICD-10-CM

## 2025-07-15 DIAGNOSIS — Z90.49 HX OF CHOLECYSTECTOMY: ICD-10-CM

## 2025-07-15 DIAGNOSIS — Z36.9 ENCOUNTER FOR ANTENATAL SCREENING, UNSPECIFIED: Primary | ICD-10-CM

## 2025-07-15 DIAGNOSIS — Z3A.24 24 WEEKS GESTATION OF PREGNANCY: ICD-10-CM

## 2025-07-15 DIAGNOSIS — O44.40 LOW-LYING PLACENTA: ICD-10-CM

## 2025-07-15 DIAGNOSIS — E28.2 PCOS (POLYCYSTIC OVARIAN SYNDROME): ICD-10-CM

## 2025-07-15 DIAGNOSIS — K76.0 FATTY INFILTRATION OF LIVER: ICD-10-CM

## 2025-07-15 DIAGNOSIS — E88.810 METABOLIC SYNDROME X: ICD-10-CM

## 2025-07-15 DIAGNOSIS — Z14.1 CYSTIC FIBROSIS CARRIER: ICD-10-CM

## 2025-08-12 ENCOUNTER — ROUTINE PRENATAL (OUTPATIENT)
Dept: OBSTETRICS AND GYNECOLOGY | Facility: CLINIC | Age: 30
End: 2025-08-12
Payer: COMMERCIAL

## 2025-08-12 VITALS — DIASTOLIC BLOOD PRESSURE: 88 MMHG | BODY MASS INDEX: 52.08 KG/M2 | SYSTOLIC BLOOD PRESSURE: 138 MMHG | WEIGHT: 293 LBS

## 2025-08-12 DIAGNOSIS — Z23 NEED FOR VACCINATION: ICD-10-CM

## 2025-08-12 DIAGNOSIS — Z23 NEED FOR TDAP VACCINATION: ICD-10-CM

## 2025-08-12 DIAGNOSIS — Z14.1 CYSTIC FIBROSIS CARRIER: ICD-10-CM

## 2025-08-12 DIAGNOSIS — Z36.9 ENCOUNTER FOR ANTENATAL SCREENING, UNSPECIFIED: Primary | ICD-10-CM

## 2025-08-12 DIAGNOSIS — O99.210 OBESITY AFFECTING PREGNANCY, ANTEPARTUM, UNSPECIFIED OBESITY TYPE: ICD-10-CM

## 2025-08-12 DIAGNOSIS — Z3A.28 28 WEEKS GESTATION OF PREGNANCY: ICD-10-CM

## 2025-08-12 DIAGNOSIS — O44.40 LOW-LYING PLACENTA: ICD-10-CM

## 2025-08-12 DIAGNOSIS — O23.40 URINARY TRACT INFECTION IN MOTHER DURING PREGNANCY, ANTEPARTUM: ICD-10-CM

## 2025-08-12 DIAGNOSIS — E28.2 PCOS (POLYCYSTIC OVARIAN SYNDROME): ICD-10-CM

## 2025-08-12 PROBLEM — Z14.8 GENETIC CARRIER STATUS: Status: RESOLVED | Noted: 2025-07-13 | Resolved: 2025-08-12

## 2025-08-21 ENCOUNTER — HOSPITAL ENCOUNTER (OUTPATIENT)
Facility: HOSPITAL | Age: 30
Discharge: HOME | End: 2025-08-21
Attending: OBSTETRICS & GYNECOLOGY | Admitting: OBSTETRICS & GYNECOLOGY
Payer: COMMERCIAL

## 2025-08-29 ENCOUNTER — HOSPITAL ENCOUNTER (OUTPATIENT)
Facility: HOSPITAL | Age: 30
Discharge: HOME | End: 2025-08-29
Attending: OBSTETRICS & GYNECOLOGY | Admitting: OBSTETRICS & GYNECOLOGY
Payer: COMMERCIAL

## 2025-08-29 ENCOUNTER — ROUTINE PRENATAL (OUTPATIENT)
Dept: OBSTETRICS AND GYNECOLOGY | Facility: CLINIC | Age: 30
End: 2025-08-29
Payer: COMMERCIAL

## 2025-08-29 VITALS
RESPIRATION RATE: 18 BRPM | DIASTOLIC BLOOD PRESSURE: 69 MMHG | HEIGHT: 63 IN | BODY MASS INDEX: 51.91 KG/M2 | WEIGHT: 293 LBS | TEMPERATURE: 99.1 F | HEART RATE: 81 BPM | SYSTOLIC BLOOD PRESSURE: 115 MMHG

## 2025-08-29 VITALS — BODY MASS INDEX: 52.08 KG/M2 | DIASTOLIC BLOOD PRESSURE: 88 MMHG | SYSTOLIC BLOOD PRESSURE: 144 MMHG | WEIGHT: 293 LBS

## 2025-08-29 DIAGNOSIS — Z36.9 ENCOUNTER FOR ANTENATAL SCREENING, UNSPECIFIED: ICD-10-CM

## 2025-08-29 DIAGNOSIS — R03.0 ELEVATED BLOOD PRESSURE READING: ICD-10-CM

## 2025-08-29 DIAGNOSIS — R82.5 POSITIVE URINE DRUG SCREEN: ICD-10-CM

## 2025-08-29 DIAGNOSIS — O23.40 URINARY TRACT INFECTION IN MOTHER DURING PREGNANCY, ANTEPARTUM: ICD-10-CM

## 2025-08-29 DIAGNOSIS — Z14.8 GENETIC CARRIER STATUS: ICD-10-CM

## 2025-08-29 DIAGNOSIS — Z34.83 PRENATAL CARE, SUBSEQUENT PREGNANCY IN THIRD TRIMESTER: Primary | ICD-10-CM

## 2025-08-29 PROBLEM — Z14.1 CYSTIC FIBROSIS CARRIER: Status: RESOLVED | Noted: 2025-04-24 | Resolved: 2025-08-29

## 2025-08-29 LAB
ALBUMIN SERPL-MCNC: 3.5 G/DL (ref 3.5–5.2)
ALBUMIN/GLOB SERPL: 1.1 G/DL
ALP SERPL-CCNC: 85 U/L (ref 39–117)
ALT SERPL W P-5'-P-CCNC: 11 U/L (ref 1–33)
ANION GAP SERPL CALCULATED.3IONS-SCNC: 13.6 MMOL/L (ref 5–15)
AST SERPL-CCNC: 14 U/L (ref 1–32)
BACTERIA UR QL AUTO: ABNORMAL /HPF
BILIRUB BLD-MCNC: NEGATIVE MG/DL
BILIRUB SERPL-MCNC: 0.3 MG/DL (ref 0–1.2)
BILIRUB UR QL STRIP: NEGATIVE
BUN SERPL-MCNC: 7.7 MG/DL (ref 6–20)
BUN/CREAT SERPL: 13.8 (ref 7–25)
CALCIUM SPEC-SCNC: 9.4 MG/DL (ref 8.6–10.5)
CHLORIDE SERPL-SCNC: 104 MMOL/L (ref 98–107)
CLARITY UR: CLEAR
CLARITY, POC: CLEAR
CO2 SERPL-SCNC: 18.4 MMOL/L (ref 22–29)
COLOR UR: ABNORMAL
COLOR UR: YELLOW
CREAT SERPL-MCNC: 0.56 MG/DL (ref 0.57–1)
CREAT UR-MCNC: 277 MG/DL
DEPRECATED RDW RBC AUTO: 47 FL (ref 37–54)
EGFRCR SERPLBLD CKD-EPI 2021: 127.7 ML/MIN/1.73
ERYTHROCYTE [DISTWIDTH] IN BLOOD BY AUTOMATED COUNT: 14.1 % (ref 12.3–15.4)
GLOBULIN UR ELPH-MCNC: 3.1 GM/DL
GLUCOSE SERPL-MCNC: 111 MG/DL (ref 65–99)
GLUCOSE UR STRIP-MCNC: NEGATIVE MG/DL
GLUCOSE UR STRIP-MCNC: NEGATIVE MG/DL
HCT VFR BLD AUTO: 39.4 % (ref 34–46.6)
HGB BLD-MCNC: 12.6 G/DL (ref 12–15.9)
HGB UR QL STRIP.AUTO: NEGATIVE
HYALINE CASTS UR QL AUTO: ABNORMAL /LPF
KETONES UR QL STRIP: ABNORMAL
KETONES UR QL: ABNORMAL
LEUKOCYTE EST, POC: ABNORMAL
LEUKOCYTE ESTERASE UR QL STRIP.AUTO: NEGATIVE
MCH RBC QN AUTO: 29.1 PG (ref 26.6–33)
MCHC RBC AUTO-ENTMCNC: 32 G/DL (ref 31.5–35.7)
MCV RBC AUTO: 91 FL (ref 79–97)
NITRITE UR QL STRIP: NEGATIVE
NITRITE UR-MCNC: NEGATIVE MG/ML
PH UR STRIP.AUTO: 6 [PH] (ref 4.5–8)
PH UR: 5 [PH] (ref 5–8)
PLATELET # BLD AUTO: 264 10*3/MM3 (ref 140–450)
PMV BLD AUTO: 10.1 FL (ref 6–12)
POTASSIUM SERPL-SCNC: 3.8 MMOL/L (ref 3.5–5.2)
PROT ?TM UR-MCNC: 36.3 MG/DL
PROT SERPL-MCNC: 6.6 G/DL (ref 6–8.5)
PROT UR QL STRIP: ABNORMAL
PROT UR STRIP-MCNC: ABNORMAL MG/DL
PROT/CREAT UR: 131 MG/G CREA (ref 0–200)
RBC # BLD AUTO: 4.33 10*6/MM3 (ref 3.77–5.28)
RBC # UR STRIP: ABNORMAL /HPF
RBC # UR STRIP: NEGATIVE /UL
REF LAB TEST METHOD: ABNORMAL
SODIUM SERPL-SCNC: 136 MMOL/L (ref 136–145)
SP GR UR STRIP: 1.03 (ref 1–1.03)
SP GR UR: 1 (ref 1–1.03)
SQUAMOUS #/AREA URNS HPF: ABNORMAL /HPF
UROBILINOGEN UR QL STRIP: ABNORMAL
UROBILINOGEN UR QL: NORMAL
WBC # UR STRIP: ABNORMAL /HPF
WBC NRBC COR # BLD AUTO: 13.57 10*3/MM3 (ref 3.4–10.8)

## 2025-08-29 PROCEDURE — 85027 COMPLETE CBC AUTOMATED: CPT | Performed by: OBSTETRICS & GYNECOLOGY

## 2025-08-29 PROCEDURE — 82570 ASSAY OF URINE CREATININE: CPT | Performed by: OBSTETRICS & GYNECOLOGY

## 2025-08-29 PROCEDURE — 80053 COMPREHEN METABOLIC PANEL: CPT | Performed by: OBSTETRICS & GYNECOLOGY

## 2025-08-29 PROCEDURE — 81001 URINALYSIS AUTO W/SCOPE: CPT | Performed by: OBSTETRICS & GYNECOLOGY

## 2025-08-29 PROCEDURE — 84156 ASSAY OF PROTEIN URINE: CPT | Performed by: OBSTETRICS & GYNECOLOGY

## 2025-08-30 LAB
AMPHETAMINES UR QL SCN: NEGATIVE NG/ML
BACTERIA UR CULT: NORMAL
BACTERIA UR CULT: NORMAL
BARBITURATES UR QL SCN: NEGATIVE NG/ML
BENZODIAZ UR QL SCN: NEGATIVE NG/ML
BUPRENORPHINE UR QL: NEGATIVE NG/ML
BZE UR QL SCN: NEGATIVE NG/ML
CANNABINOIDS UR QL SCN: NEGATIVE NG/ML
CREAT UR-MCNC: 179.2 MG/DL (ref 20–300)
FENTANYL UR-MCNC: NEGATIVE PG/ML
LABORATORY COMMENT REPORT: NORMAL
MEPERIDINE UR QL: NEGATIVE NG/ML
METHADONE UR QL SCN: NEGATIVE NG/ML
OPIATES UR QL SCN: NEGATIVE NG/ML
OXYCODONE+OXYMORPHONE UR QL SCN: NEGATIVE NG/ML
PCP UR QL: NEGATIVE NG/ML
PH UR: 6.4 [PH] (ref 4.5–8.9)
PROPOXYPH UR QL SCN: NEGATIVE NG/ML
TRAMADOL UR QL SCN: NEGATIVE NG/ML

## (undated) DEVICE — TROCAR: Brand: KII SLEEVE

## (undated) DEVICE — 3M™ IOBAN™ 2 ANTIMICROBIAL INCISE DRAPE 6650EZ: Brand: IOBAN™ 2

## (undated) DEVICE — LAPAROVUE VISIBILITY SYSTEM LAPAROSCOPIC SOLUTIONS: Brand: LAPAROVUE

## (undated) DEVICE — TROCAR: Brand: KII FIOS FIRST ENTRY

## (undated) DEVICE — SYR LUERLOK 20CC BX/50

## (undated) DEVICE — DISPOSABLE MONOPOLAR ENDOSCOPIC CORD 10 FT. (3M): Brand: KIRWAN

## (undated) DEVICE — SYR LL TP 10ML STRL

## (undated) DEVICE — TROCAR: Brand: KII OPTICAL ACCESS SYSTEM

## (undated) DEVICE — PK LAP CHOLE BG

## (undated) DEVICE — GLV SURG PREMIERPRO ORTHO LTX PF SZ7 BRN

## (undated) DEVICE — SOL NACL 0.9PCT 1000ML

## (undated) DEVICE — ENDOPATH PNEUMONEEDLE INSUFFLATION NEEDLES WITH LUER LOCK CONNECTORS 120MM: Brand: ENDOPATH

## (undated) DEVICE — STPCK 3WY D201 DISCOFIX

## (undated) DEVICE — LAPAROSCOPIC SMOKE FILTRATION SYSTEM: Brand: PALL LAPAROSHIELD® PLUS LAPAROSCOPIC SMOKE FILTRATION SYSTEM

## (undated) DEVICE — INSUFFLATION TUBING SET, WITH GAS FILTER: Brand: N.A.

## (undated) DEVICE — ENDOPOUCH RETRIEVER SPECIMEN RETRIEVAL BAGS: Brand: ENDOPOUCH RETRIEVER

## (undated) DEVICE — EXOFIN PRECISION PEN HIGH VISCOSITY TOPICAL SKIN ADHESIVE: Brand: EXOFIN PRECISION PEN, 1G

## (undated) DEVICE — DEV SUT GRSPR CLOSUR 15CM 14G

## (undated) DEVICE — SUT MNCRYL PLS ANTIB UD 4/0 PS2 18IN

## (undated) DEVICE — SUT VIC 0 UR6 27IN VCP603H

## (undated) DEVICE — TOWEL,OR,DSP,ST,BLUE,STD,4/PK,20PK/CS: Brand: MEDLINE